# Patient Record
Sex: FEMALE | Race: WHITE | NOT HISPANIC OR LATINO | Employment: FULL TIME | URBAN - METROPOLITAN AREA
[De-identification: names, ages, dates, MRNs, and addresses within clinical notes are randomized per-mention and may not be internally consistent; named-entity substitution may affect disease eponyms.]

---

## 2017-11-22 ENCOUNTER — TELEPHONE (OUTPATIENT)
Dept: OTHER | Facility: CLINIC | Age: 37
End: 2017-11-22

## 2017-11-22 NOTE — TELEPHONE ENCOUNTER
11/22/2017    Call Regarding Onboarding [employer] Other     Attempt 1    Message on voicemail     Comments: 1 child       Outreach   CC

## 2017-12-01 NOTE — TELEPHONE ENCOUNTER
12/1/2017    Call Regarding Onboarding [employer] Other     Attempt 2    Message on voicemail     Comments: 1 child       Outreach   CC

## 2017-12-19 NOTE — TELEPHONE ENCOUNTER
12/19/2017    Call Regarding Onboarding [employer] Other     Attempt 3    Message on voicemail     Comments:       Outreach   SB

## 2018-02-03 ENCOUNTER — HEALTH MAINTENANCE LETTER (OUTPATIENT)
Age: 38
End: 2018-02-03

## 2018-05-14 ENCOUNTER — OFFICE VISIT (OUTPATIENT)
Dept: FAMILY MEDICINE | Facility: CLINIC | Age: 38
End: 2018-05-14
Payer: COMMERCIAL

## 2018-05-14 VITALS
HEIGHT: 67 IN | DIASTOLIC BLOOD PRESSURE: 77 MMHG | TEMPERATURE: 98 F | HEART RATE: 76 BPM | RESPIRATION RATE: 16 BRPM | BODY MASS INDEX: 35.16 KG/M2 | SYSTOLIC BLOOD PRESSURE: 117 MMHG | WEIGHT: 224 LBS | OXYGEN SATURATION: 98 %

## 2018-05-14 DIAGNOSIS — M54.42 ACUTE LEFT-SIDED LOW BACK PAIN WITH LEFT-SIDED SCIATICA: Primary | ICD-10-CM

## 2018-05-14 PROCEDURE — 99213 OFFICE O/P EST LOW 20 MIN: CPT | Performed by: FAMILY MEDICINE

## 2018-05-14 RX ORDER — HYDROCODONE BITARTRATE AND ACETAMINOPHEN 5; 325 MG/1; MG/1
1 TABLET ORAL EVERY 4 HOURS PRN
Qty: 20 TABLET | Refills: 0 | Status: SHIPPED | OUTPATIENT
Start: 2018-05-14 | End: 2018-05-29

## 2018-05-14 RX ORDER — PREDNISONE 20 MG/1
40 TABLET ORAL DAILY
Qty: 10 TABLET | Refills: 0 | Status: SHIPPED | OUTPATIENT
Start: 2018-05-14 | End: 2018-05-30

## 2018-05-14 ASSESSMENT — PAIN SCALES - GENERAL: PAINLEVEL: EXTREME PAIN (8)

## 2018-05-14 NOTE — MR AVS SNAPSHOT
"              After Visit Summary   5/14/2018    Marisa Randall    MRN: [id number]           Patient Information     Date Of Birth          1980        Visit Information        Provider Department      5/14/2018 8:20 AM Anita Juarez MD [facility]        Today's Diagnoses     Acute left-sided low back pain with left-sided sciatica    -  1       Follow-ups after your visit        Who to contact     If you have questions or need follow up information about today's clinic visit or your schedule please contact [facility] directly at [phone].  Normal or non-critical lab and imaging results will be communicated to you by [employer], letter or phone within 4 business days after the clinic has received the results. If you do not hear from us within 7 days, please contact the clinic through [employer] or phone. If you have a critical or abnormal lab result, we will notify you by phone as soon as possible.  Submit refill requests through [employer] or call your pharmacy and they will forward the refill request to us. Please allow 3 business days for your refill to be completed.          Additional Information About Your Visit        MyChart Information     [employer] gives you secure access to your electronic health record. If you see a primary care provider, you can also send messages to your care team and make appointments. If you have questions, please call your primary care clinic.  If you do not have a primary care provider, please call [phone] and they will assist you.        Care EveryWhere ID     This is your Care EveryWhere ID. This could be used by other organizations to access your [place] medical records  [id number]        Your Vitals Were     Pulse Temperature Respirations Height Pulse Oximetry BMI (Body Mass Index)    76 98  F (36.7  C) (Oral) 16 5' 7\" (1.702 m) 98% 35.08 kg/m2       Blood Pressure from Last 3 Encounters:   05/14/18 117/77   06/14/11 104/78   05/25/11 92/60    " Weight from Last 3 Encounters:   05/14/18 224 lb (101.6 kg)   06/14/11 219 lb (99.3 kg)   05/25/11 223 lb (101.2 kg)              Today, you had the following     No orders found for display         Today's Medication Changes          These changes are accurate as of 5/14/18 12:53 PM.  If you have any questions, ask your nurse or doctor.               Start taking these medicines.        Dose/Directions    HYDROcodone-acetaminophen 5-325 MG per tablet   Commonly known as:  NORCO   Used for:  Acute left-sided low back pain with left-sided sciatica   Started by:  Anita Juarez MD        Dose:  1 tablet   Take 1 tablet by mouth every 4 hours as needed for pain   Quantity:  20 tablet   Refills:  0       predniSONE 20 MG tablet   Commonly known as:  DELTASONE   Used for:  Acute left-sided low back pain with left-sided sciatica   Started by:  Anita Juarez MD        Dose:  40 mg   Take 2 tablets (40 mg) by mouth daily for 5 days   Quantity:  10 tablet   Refills:  0            Where to get your medicines      These medications were sent to [address] [address]     Phone:  [phone]     predniSONE 20 MG tablet         Some of these will need a paper prescription and others can be bought over the counter.  Ask your nurse if you have questions.     Bring a paper prescription for each of these medications     HYDROcodone-acetaminophen 5-325 MG per tablet               Information about OPIOIDS     PRESCRIPTION OPIOIDS: WHAT YOU NEED TO KNOW   You have a prescription for an opioid (narcotic) pain medicine. Opioids can cause addiction. If you have a history of chemical dependency of any type, you are at a higher risk of becoming addicted to opioids. Only take this medicine after all other options have been tried. Take it for as short a time and as few doses as possible.     Do not:    Drive. If you drive while taking these  medicines, you could be arrested for driving under the influence (DUI).    Operate heavy machinery    Do any other dangerous activities while taking these medicines.     Drink any alcohol while taking these medicines.      Take with any other medicines that contain acetaminophen. Read all labels carefully. Look for the word  acetaminophen  or  Tylenol.  Ask your pharmacist if you have questions or are unsure.    Store your pills in a secure place, locked if possible. We will not replace any lost or stolen medicine. If you don t finish your medicine, please throw away (dispose) as directed by your pharmacist. The [place] Pollution Control Agency has more information about safe disposal: https://www.pca.[facility].mn.us/living-green/managing-unwanted-medications    All opioids tend to cause constipation. Drink plenty of water and eat foods that have a lot of fiber, such as fruits, vegetables, prune juice, apple juice and high-fiber cereal. Take a laxative (Miralax, milk of magnesia, Colace, Senna) if you don t move your bowels at least every other day.          Primary Care Provider Office Phone # Fax #    Dagoberto Sykes [phone]       [facility] [address] [facility] [address]        Equal Access to Services     GLENN MAIN : Hadii gauri busch hadasho Soivanali, waaxda luqadaha, qaybta kaalmada adecrisyada, ema shah. So [facility] [phone].    ATENCIÓN: Si habla español, tiene a salinas disposición servicios gratuitos de asistencia lingüística. Rocío al [phone].    We comply with applicable federal civil rights laws and [place] laws. We do not discriminate on the basis of race, color, national origin, age, disability, sex, sexual orientation, or gender identity.            Thank you!     Thank you for choosing [facility]  for your care. Our goal is always to provide you with excellent care. Hearing back from our patients is one way we can continue to  improve our services. Please take a few minutes to complete the written survey that you may receive in the mail after your visit with us. Thank you!             Your Updated Medication List - Protect others around you: Learn how to safely use, store and throw away your medicines at www.disposemymeds.org.          This list is accurate as of 5/14/18 12:53 PM.  Always use your most recent med list.                   Brand Name Dispense Instructions for use Diagnosis    HYDROcodone-acetaminophen 5-325 MG per tablet    NORCO    20 tablet    Take 1 tablet by mouth every 4 hours as needed for pain    Acute left-sided low back pain with left-sided sciatica       MIRENA IU      by Intrauterine route.        predniSONE 20 MG tablet    DELTASONE    10 tablet    Take 2 tablets (40 mg) by mouth daily for 5 days    Acute left-sided low back pain with left-sided sciatica

## 2018-05-14 NOTE — PROGRESS NOTES
"  SUBJECTIVE:   Marisa Randall is a 38 year old female who presents to clinic today for the following health issues:        Back Pain       Duration: 1 week         Specific cause: none    Description:   Location of pain: low back both  Character of pain: sharp, dull ache and stabbing  Pain radiation:down to both knees  New numbness or weakness in legs, not attributed to pain:  no     Intensity: Currently 8/10    History:   Pain interferes with job: YES,   History of back problems: previous herniated disc  Any previous MRI or X-rays: Yes [facility] 2015  Sees a specialist for back pain:  No  Therapies tried without relief: heat and cold and otc meds    Alleviating factors:   Improved by: none      Precipitating factors:  Worsened by: Lifting, Bending, Standing, Sitting, Lying Flat, Walking and Coughing    Functional and Psychosocial Screen (Al STarT Back):      Not performed today          Accompanying Signs & Symptoms:  Risk of Fracture:  None  Risk of Cauda Equina:  None  Risk of Infection:  None  Risk of Cancer:  None  Risk of Ankylosing Spondylitis:  Onset at age <35, male, AND morning back stiffness. no       Pt with herniated disc at L5-S1 in 2015.  Had injections and gave her gabapentin and then resolved on its own  This was done in [facility]  Does have some radiculopathy down to knees  Right> left                Problem list and histories reviewed & adjusted, as indicated.  Additional history: as documented    Labs reviewed in EPIC    Reviewed and updated as needed this visit by clinical staff  Tobacco  Allergies  Meds  Med Hx  Surg Hx  Fam Hx  Soc Hx      Reviewed and updated as needed this visit by Provider         ROS:  Constitutional, HEENT, cardiovascular, pulmonary, gi and gu systems are negative, except as otherwise noted.    OBJECTIVE:     /77  Pulse 76  Temp 98  F (36.7  C) (Oral)  Resp 16  Ht 5' 7\" (1.702 m)  Wt 224 lb (101.6 kg)  SpO2 98%  BMI 35.08 kg/m2  Body mass " index is 35.08 kg/(m^2).  GENERAL: healthy, alert and no distress  BACK: no lumbar spinal tenderness. Does have some left SI joint pain to palpation , + SLR test bilaterally, left> right    Diagnostic Test Results:  none     ASSESSMENT/PLAN:     1. Acute left-sided low back pain with left-sided sciatica  Consider PT. FU if any red flag signs or if not improving  - predniSONE (DELTASONE) 20 MG tablet; Take 2 tablets (40 mg) by mouth daily for 5 days  Dispense: 10 tablet; Refill: 0  - HYDROcodone-acetaminophen (NORCO) 5-325 MG per tablet; Take 1 tablet by mouth every 4 hours as needed for pain  Dispense: 20 tablet; Refill: 0        Anita Vazquez MD  [facility]

## 2018-05-29 ENCOUNTER — MYC REFILL (OUTPATIENT)
Dept: FAMILY MEDICINE | Facility: CLINIC | Age: 38
End: 2018-05-29

## 2018-05-29 DIAGNOSIS — M54.42 ACUTE LEFT-SIDED LOW BACK PAIN WITH LEFT-SIDED SCIATICA: ICD-10-CM

## 2018-05-29 NOTE — TELEPHONE ENCOUNTER
Controlled Substance Refill Request for norco  Problem List Complete:  No   PROVIDER TO CONSIDER COMPLETION OF PROBLEM LIST AND OVERVIEW/CONTROLLED SUBSTANCE AGREEMENT  Last Written Prescription Date:  5/14/18  Last Fill Quantity: 20,   # refills: 0  [place] [facility] [place]14/18  Future Office visit: none  Controlled substance agreement on file: No.   checked in past 6 months? Unable to pull  up. Getting blank page on Internet.  Please see other message for steroid request.   Thank you, Neha Mohan, HALLIEN RN

## 2018-05-29 NOTE — TELEPHONE ENCOUNTER
Message from [employer]:  Original authorizing provider: MD Marisa Davis would like a refill of the following medications:  HYDROcodone-acetaminophen (NORCO) 5-325 MG per tablet [Anita Vazquez MD]    Preferred pharmacy: [facility] [address], [address]    Comment:  I am still having pain. I am also wondering if I can get a refill of the oral steroid that I was given.

## 2018-05-30 RX ORDER — HYDROCODONE BITARTRATE AND ACETAMINOPHEN 5; 325 MG/1; MG/1
1 TABLET ORAL EVERY 4 HOURS PRN
Qty: 20 TABLET | Refills: 0 | Status: SHIPPED | OUTPATIENT
Start: 2018-05-30 | End: 2022-11-29

## 2018-05-30 RX ORDER — PREDNISONE 20 MG/1
40 TABLET ORAL DAILY
Qty: 10 TABLET | Refills: 0 | Status: SHIPPED | OUTPATIENT
Start: 2018-05-30 | End: 2018-06-06

## 2018-05-30 NOTE — TELEPHONE ENCOUNTER
I walked 1 Rx to the clinic pharmacy and dropped it off to be filled.  [employer]ado message sent.  Shweta Bowser,

## 2018-05-30 NOTE — TELEPHONE ENCOUNTER
Will refill both one more time  Will need to be seen for further refills.   Please fax rx and let pt know    Anita Vazquez MD

## 2018-05-30 NOTE — TELEPHONE ENCOUNTER
Did the steroid help at all the first time?  If not, does not pay to do a second round.    Anita Vazquez MD

## 2018-06-06 ENCOUNTER — OFFICE VISIT (OUTPATIENT)
Dept: INTERNAL MEDICINE | Facility: CLINIC | Age: 38
End: 2018-06-06
Payer: COMMERCIAL

## 2018-06-06 VITALS
WEIGHT: 224 LBS | BODY MASS INDEX: 35.16 KG/M2 | OXYGEN SATURATION: 98 % | HEIGHT: 67 IN | SYSTOLIC BLOOD PRESSURE: 123 MMHG | RESPIRATION RATE: 16 BRPM | DIASTOLIC BLOOD PRESSURE: 81 MMHG | TEMPERATURE: 98.2 F | HEART RATE: 93 BPM

## 2018-06-06 DIAGNOSIS — S39.012D BACK STRAIN, SUBSEQUENT ENCOUNTER: Primary | ICD-10-CM

## 2018-06-06 PROCEDURE — 99214 OFFICE O/P EST MOD 30 MIN: CPT | Performed by: INTERNAL MEDICINE

## 2018-06-06 RX ORDER — METHOCARBAMOL 500 MG/1
1000 TABLET, FILM COATED ORAL 3 TIMES DAILY PRN
Qty: 30 TABLET | Refills: 1 | Status: SHIPPED | OUTPATIENT
Start: 2018-06-06

## 2018-06-06 RX ORDER — KETOROLAC TROMETHAMINE 10 MG/1
10 TABLET, FILM COATED ORAL EVERY 6 HOURS PRN
Qty: 20 TABLET | Refills: 0 | Status: SHIPPED | OUTPATIENT
Start: 2018-06-06 | End: 2018-06-13

## 2018-06-06 NOTE — PATIENT INSTRUCTIONS
Please [phone] call to schedule the MRI and complete it within the next 1-2 weeks.      Start taking the nonsteroidal anti-inflammatory drug (NSAID), toradol, as needed.  Please take the Toradol per package instructions. Do not take Aleve (naproxen), aspirin, ibuprofen (Motrin, Advil) or Excedrin on the same day that you take the Toradol.  Take it with food or milk and please drink enough fluids, 6-8 cups of non-caffeinated beverages a day.  Take the muscle relaxant Robaxin as per prescription directions.  Take the above medications and rest for 2 days and on the third day, start doing range of motion exercises with the affected area.  Use cooling or heating packs-whatever makes your pain better.  Return to clinic if not better after 2 weeks of the above treatment.      If you develop new or worse numbness or weakness of your legs or an inability to hold in urine or feces, please call the clinic promptly to discuss this. If you develop an inability to get your urine out, please call please call the clinic promptly to discuss this. If you are calling after regular business hours and cannot reach a live person in our clinic, I would advise you to go to the Emergency Room for these symptoms.

## 2018-06-06 NOTE — PROGRESS NOTES
SUBJECTIVE:   Marisa Randall is a 38 year old female who presents to clinic today for the following health issues:      Back Pain       Duration: 1.5 months started in back and now going going down hip to ankle        Specific cause: unsure, has a bulging disk diagnosed 5 years ago    Description:   Location of pain: low back right, hip right and right leg  Character of pain: sharp  Pain radiation:radiates into the right leg and radiates into the right foot  New numbness or weakness in legs, not attributed to pain:  no     Intensity: Currently 8/10    History:   Pain interferes with job: YES,   History of back problems: has a buldging disk  Any previous MRI or X-rays: Yes--at [facility] .  Date 5 years ago  Sees a specialist for back pain:  No  Therapies tried without relief: cold, heat, opioids, home exercises and rest; patient also had a TENS unit on 5.19.18 but it has not helped.      Alleviating factors:   Improved by: nothing      Precipitating factors:  Worsened by: Sitting and Walking    Functional and Psychosocial Screen (Al STarT Back):      Not performed today    Patient was seen on 5.14.18, rxed a 5 day prednisone burst and prn Norco-the location of the pain has changed, ie, from the low back, now to the right lower extremity-the pain now extends from the hip to the ankle, hurts to lie down on that side. No numbness or weakness of the right lower extremity.   Of note, the patient had a herniated lumbar disc (L5-S1) 3-8 yrs ago, for which she got a variety fo treatments, including P.T., injections, gabapentin, followed with Pain Medicine and it eventually resolved in a few months.  She feels that it was the 2nd cortisone shot that had really helped her back pain, at that time a few yrs ago.     No lbp red flags.  No IVDU.         Reviewed and updated as needed this visit by clinical staff  Tobacco  Allergies  Meds  Problems  Med Hx  Surg Hx  Fam Hx  Soc Hx        Reviewed and updated as  "needed this visit by Provider  Meds  Problems           Patient Active Problem List   Diagnosis     Carpal tunnel syndrome     Radial styloid tenosynovitis     Synovitis and tenosynovitis     Wrist tendonitis       History reviewed. No pertinent past medical history.    History reviewed. No pertinent surgical history.    Family History   Problem Relation Age of Onset     DIABETES Mother      Colon Cancer Father        Social History   Substance Use Topics     Smoking status: Former Smoker     Smokeless tobacco: Never Used     Alcohol use Yes      Comment: rare       Current Outpatient Prescriptions   Medication     HYDROcodone-acetaminophen (NORCO) 5-325 MG per tablet     ketorolac (TORADOL) 10 MG tablet     Levonorgestrel (MIRENA IU)     methocarbamol (ROBAXIN) 500 MG tablet     No current facility-administered medications for this visit.          ROS:  Constitutional, HEENT, cardiovascular, pulmonary, GI, , musculoskeletal, neuro, skin, endocrine and psych systems are negative, except as otherwise noted.     OBJECTIVE:                                                    /81  Pulse 93  Temp 98.2  F (36.8  C) (Oral)  Resp 16  Ht 5' 7\" (1.702 m)  Wt 224 lb (101.6 kg)  SpO2 98%  BMI 35.08 kg/m2     GENERAL APPEARANCE: healthy, alert and in no distress    MS:   There was *no* pain of the right lower extremity with internal rotation of the right hip. There was pain on external rotation of the right lower extremity. SLR of the right lower extremity was positive. O/w, LBP exam within normal limits.  ow, no musculoskeletal defects are noted and gait is age appropriate without ataxia  SKIN: no suspicious lesions or rashes  NEURO: mentation intact and speech normal  PSYCH: mentation appears normal and affect normal/bright.    Results for orders placed or performed in visit on 09/02/15   ABSTRACT HPV-NO CHARGE   Result Value Ref Range    HPV Abstract See Scanned Document     Narrative    LAB RESULT " [facility]   ABSTRACT PAP-NO CHARGE   Result Value Ref Range    PAP-ABSTRACT See Scanned Document     Narrative    LAB RESULT [facility]       Recent Results (from the past 744 hour(s))   MR Lumbar Spine w/o Contrast    Narrative    MRI LUMBAR SPINE WITHOUT CONTRAST June 11, 2018 5:20 PM     HISTORY: Back pain for three weeks; positive straight leg raise on  exam, thus far not improving with conservative treatment. Back strain,  subsequent encounter.    TECHNIQUE: Multiplanar multisequence MRI of the lumbar spine without  contrast.    COMPARISON: None.    FINDINGS: The report is dictated assuming five lumbar-type vertebral  bodies. Sagittal images demonstrate normal vertebral body height. Bone  marrow signal is unremarkable. Tip of the conus medullaris and cauda  equina are unremarkable.     T12-L1: No disc herniation or stenosis. Facet joints are unremarkable.    L1-L2: No disc herniation or stenosis. Facet joints are unremarkable.       L2-L3: No disc herniation or stenosis. Facet joints are unremarkable.    L3-L4: No disc herniation or stenosis. Facet joints are unremarkable.     L4-L5: There is a large right central disc herniation that protrudes  posteriorly approximately 0.9 cm and measures approximately 1.2 cm at  its base. Severe central and right subarticular stenosis. Neural  foramen are patent. Mild left facet degenerative changes.    L5-S1: Degenerative disc disease with loss of disc space height. Mild  disc bulge. No central stenosis. Mild bilateral foraminal stenosis.    Paraspinous soft tissues: Unremarkable.      Impression    IMPRESSION:    1. At L4-L5 there is a large right central disc herniation that  results in severe central and right subarticular stenosis. Neural  foramen are patent.  2. Degenerative disc disease at L5-S1 without central stenosis. Mild  bilateral foraminal stenosis.    DIANDRA LARSON MD         ASSESSMENT/PLAN:                                                         ICD-10-CM    1. Back strain, subsequent encounter S39.012D ketorolac (TORADOL) 10 MG tablet     methocarbamol (ROBAXIN) 500 MG tablet     MR Lumbar Spine w/o Contrast       Patient Instructions   Please [phone] call to schedule the MRI and complete it within the next 1-2 weeks.      Start taking the nonsteroidal anti-inflammatory drug (NSAID), toradol, as needed.  Please take the Toradol per package instructions. Do not take Aleve (naproxen), aspirin, ibuprofen (Motrin, Advil) or Excedrin on the same day that you take the Toradol.  Take it with food or milk and please drink enough fluids, 6-8 cups of non-caffeinated beverages a day.  Take the muscle relaxant Robaxin as per prescription directions.  Take the above medications and rest for 2 days and on the third day, start doing range of motion exercises with the affected area.  Use cooling or heating packs-whatever makes your pain better.  Return to clinic if not better after 2 weeks of the above treatment.      If you develop new or worse numbness or weakness of your legs or an inability to hold in urine or feces, please call the clinic promptly to discuss this. If you develop an inability to get your urine out, please call please call the clinic promptly to discuss this. If you are calling after regular business hours and cannot reach a live person in our clinic, I would advise you to go to the Emergency Room for these symptoms.         Liza Patel MD    [facility]  [address] [address] [phone]

## 2018-06-06 NOTE — MR AVS SNAPSHOT
After Visit Summary   6/6/2018    Marisa Randall    MRN: [id number]           Patient Information     Date Of Birth          1980        Visit Information        Provider Department      6/6/2018 6:00 PM Liza Patel MD [facility]        Today's Diagnoses     Back strain, subsequent encounter    -  1      Care Instructions    Please [phone] call to schedule the MRI and complete it within the next 1-2 weeks.      Start taking the nonsteroidal anti-inflammatory drug (NSAID), toradol, as needed.  Please take the Toradol per package instructions. Do not take Aleve (naproxen), aspirin, ibuprofen (Motrin, Advil) or Excedrin on the same day that you take the Toradol.  Take it with food or milk and please drink enough fluids, 6-8 cups of non-caffeinated beverages a day.  Take the muscle relaxant Robaxin as per prescription directions.  Take the above medications and rest for 2 days and on the third day, start doing range of motion exercises with the affected area.  Use cooling or heating packs-whatever makes your pain better.  Return to clinic if not better after 2 weeks of the above treatment.      If you develop new or worse numbness or weakness of your legs or an inability to hold in urine or feces, please call the clinic promptly to discuss this. If you develop an inability to get your urine out, please call please call the clinic promptly to discuss this. If you are calling after regular business hours and cannot reach a live person in our clinic, I would advise you to go to the Emergency Room for these symptoms.             Follow-ups after your visit        Future tests that were ordered for you today     Open Future Orders        Priority Expected Expires Ordered    MR Lumbar Spine w/o Contrast Routine  6/6/2019 6/6/2018            Who to contact     If you have questions or need follow up information about today's clinic visit or your schedule please contact  "[facility] directly at [phone].  Normal or non-critical lab and imaging results will be communicated to you by MyChart, letter or phone within 4 business days after the clinic has received the results. If you do not hear from us within 7 days, please contact the clinic through IIDhart or phone. If you have a critical or abnormal lab result, we will notify you by phone as soon as possible.  Submit refill requests through Getyoo or call your pharmacy and they will forward the refill request to us. Please allow 3 business days for your refill to be completed.          Additional Information About Your Visit        IIDharEyevensys Information     Getyoo gives you secure access to your electronic health record. If you see a primary care provider, you can also send messages to your care team and make appointments. If you have questions, please call your primary care clinic.  If you do not have a primary care provider, please call [phone] and they will assist you.        Care EveryWhere ID     This is your Care EveryWhere ID. This could be used by other organizations to access your [facility] medical records  [id number]        Your Vitals Were     Pulse Temperature Respirations Height Pulse Oximetry BMI (Body Mass Index)    93 98.2  F (36.8  C) (Oral) 16 5' 7\" (1.702 m) 98% 35.08 kg/m2       Blood Pressure from Last 3 Encounters:   06/06/18 123/81   05/14/18 117/77   06/14/11 104/78    Weight from Last 3 Encounters:   06/06/18 224 lb (101.6 kg)   05/14/18 224 lb (101.6 kg)   06/14/11 219 lb (99.3 kg)                 Today's Medication Changes          These changes are accurate as of 6/6/18  6:35 PM.  If you have any questions, ask your nurse or doctor.               Start taking these medicines.        Dose/Directions    ketorolac 10 MG tablet   Commonly known as:  TORADOL   Used for:  Back strain, subsequent encounter   Started by:  Liza Patel MD        Dose:  10 mg   Take 1 tablet (10 " mg) by mouth every 6 hours as needed   Quantity:  20 tablet   Refills:  0       methocarbamol 500 MG tablet   Commonly known as:  ROBAXIN   Used for:  Back strain, subsequent encounter   Started by:  Liza Patel MD        Dose:  1000 mg   Take 2 tablets (1,000 mg) by mouth 3 times daily as needed for muscle spasms   Quantity:  30 tablet   Refills:  1            Where to get your medicines      These medications were sent to [facility] [address] [address], [address]  [address] [address] [address]     Phone:  [phone]     ketorolac 10 MG tablet    methocarbamol 500 MG tablet                Primary Care Provider Office Phone # Fax #    Dagoberto Sykes [phone]       [facility] [address] [place] [address]        Equal Access to Services     [facility]: Hadii gauri busch hadasho Soomaali, waaxda luqadaha, qaybta kaalmada adeegyada, ema parra haymarge tony . So [facility] [phone].    ATENCIÓN: Si habla español, tiene a salinas disposición servicios gratuitos de asistencia lingüística. Llame al [phone].    We comply with applicable federal civil rights laws and [place] laws. We do not discriminate on the basis of race, color, national origin, age, disability, sex, sexual orientation, or gender identity.            Thank you!     Thank you for choosing [facility]  for your care. Our goal is always to provide you with excellent care. Hearing back from our patients is one way we can continue to improve our services. Please take a few minutes to complete the written survey that you may receive in the mail after your visit with us. Thank you!             Your Updated Medication List - Protect others around you: Learn how to safely use, store and throw away your medicines at www.disposemymeds.org.          This list is accurate as of 6/6/18  6:35 PM.  Always use your most recent med list.                    Brand Name Dispense Instructions for use Diagnosis    HYDROcodone-acetaminophen 5-325 MG per tablet    NORCO    20 tablet    Take 1 tablet by mouth every 4 hours as needed for pain    Acute left-sided low back pain with left-sided sciatica       ketorolac 10 MG tablet    TORADOL    20 tablet    Take 1 tablet (10 mg) by mouth every 6 hours as needed    Back strain, subsequent encounter       methocarbamol 500 MG tablet    ROBAXIN    30 tablet    Take 2 tablets (1,000 mg) by mouth 3 times daily as needed for muscle spasms    Back strain, subsequent encounter       MIRENA IU      by Intrauterine route.

## 2018-06-11 ENCOUNTER — RADIANT APPOINTMENT (OUTPATIENT)
Dept: MRI IMAGING | Facility: CLINIC | Age: 38
End: 2018-06-11
Attending: INTERNAL MEDICINE
Payer: COMMERCIAL

## 2018-06-11 ENCOUNTER — MYC MEDICAL ADVICE (OUTPATIENT)
Dept: INTERNAL MEDICINE | Facility: CLINIC | Age: 38
End: 2018-06-11

## 2018-06-11 DIAGNOSIS — M51.26 HERNIATION OF INTERVERTEBRAL DISC BETWEEN L4 AND L5: Primary | ICD-10-CM

## 2018-06-11 DIAGNOSIS — S39.012D BACK STRAIN, SUBSEQUENT ENCOUNTER: ICD-10-CM

## 2018-06-11 PROCEDURE — 72148 MRI LUMBAR SPINE W/O DYE: CPT | Mod: TC

## 2018-06-12 PROBLEM — M51.26 HERNIATION OF INTERVERTEBRAL DISC BETWEEN L4 AND L5: Status: ACTIVE | Noted: 2018-06-12

## 2018-06-12 NOTE — PROGRESS NOTES
PLEASE CALL PATIENT TO GET REFERRAL SCHEDULED ASAP FOR NONSURGICAL SPINE: Dear Marisa,   I am covering for [employer] while she is away. 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  You also have stenosis (narrowing of spine canal).  I would like you to meet with nonsurgical spine specialist asap to discuss best options from here.         If you have any questions or concerns, please call the clinic at [phone].    Sincerely,  Zeina Yañez PA-C

## 2018-06-13 ENCOUNTER — OFFICE VISIT (OUTPATIENT)
Dept: ORTHOPEDICS | Facility: CLINIC | Age: 38
End: 2018-06-13
Payer: COMMERCIAL

## 2018-06-13 VITALS
DIASTOLIC BLOOD PRESSURE: 84 MMHG | BODY MASS INDEX: 35.31 KG/M2 | SYSTOLIC BLOOD PRESSURE: 128 MMHG | HEIGHT: 67 IN | WEIGHT: 225 LBS

## 2018-06-13 DIAGNOSIS — M54.16 LUMBAR RADICULOPATHY, RIGHT: ICD-10-CM

## 2018-06-13 DIAGNOSIS — M51.26 HERNIATION OF INTERVERTEBRAL DISC BETWEEN L4 AND L5: Primary | ICD-10-CM

## 2018-06-13 PROCEDURE — 99244 OFF/OP CNSLTJ NEW/EST MOD 40: CPT | Performed by: PEDIATRICS

## 2018-06-13 RX ORDER — GABAPENTIN 300 MG/1
CAPSULE ORAL
Qty: 90 CAPSULE | Refills: 0 | Status: SHIPPED | OUTPATIENT
Start: 2018-06-13 | End: 2022-11-29

## 2018-06-13 RX ORDER — KETOROLAC TROMETHAMINE 10 MG/1
10 TABLET, FILM COATED ORAL EVERY 6 HOURS PRN
Qty: 20 TABLET | Refills: 0 | Status: SHIPPED | OUTPATIENT
Start: 2018-06-13 | End: 2022-11-29

## 2018-06-13 NOTE — MR AVS SNAPSHOT
After Visit Summary   6/13/2018    Marisa Randall    MRN: [id number]           Patient Information     Date Of Birth          1980        Visit Information        Provider Department      6/13/2018 1:40 PM Danny Tatum,  [facility]        Today's Diagnoses     Lumbar radiculopathy, right          Care Instructions    Apply ice or cold packs, heat or warm packs intermittently as needed to relieve pain.     Referred for corticosteroid injection     Prescribed Toradol     Prescribed Gabapentin, call with an update in 7-10 days     Referred to physical therapy     Follow up 2-3 weeks after corticosteroid injection for recheck           Follow-ups after your visit        Additional Services     AB PT, HAND, AND CHIROPRACTIC REFERRAL       **This order will print in the [facility] Scheduling Office**    Physical Therapy, Hand Therapy and Chiropractic Care are available through:    *[facility]  *[facility]  *[facility]    Call one number to schedule at any of the above locations: [phone].    Your provider has referred you to: Physical Therapy at [facility] or [facility]    Indication/Reason for Referral: Low Back Pain  Onset of Illness:   Therapy Orders: Evaluate and Treat  Special Programs: None  Special Request: None    Al Melendez      Additional Comments for the Therapist or Chiropractor:       Please be aware that coverage of these services is subject to the terms and limitations of your health insurance plan.  Call member services at your health plan with any benefit or coverage questions.      Please bring the following to your appointment:    *Your personal calendar for scheduling future appointments  *Comfortable clothing            PAIN MANAGEMENT REFERRAL       Your provider [place] you [place] [facility] -    Reason for Referral: Procedure Order Epidural:  Lumbar (Advanced  imaging required in the last 3 years)      What is your diagnosis for the patient's pain? Lumbar radiculopathy       For any questions, contact the [facility] at [phone].     **ANY DIAGNOSTIC TESTS THAT ARE NOT IN EPIC SHOULD BE SENT TO THE PAIN CENTER**    REGARDING OPIOID MEDICATIONS:  The discussion of opioids management, appropriateness of therapy, and dosing will be discussed in patients being seen for evaluation.  The pain management clinics are not long-term prescribing clinics, with transition of prescribing of medications ultimately going back to the referring provider/PCP.  If prescribing is taken over at the pain clinic, it is in actively involved patients whom are appropriate for opioids, urine drug screening is completed, and long-term prescribing plan has been determined.  Therefore, we will not be automatically taking over prescribing at the patient's first visit.  Is this agreeable to you? agrees.     Please be aware that coverage of these services is subject to the terms and limitations of your health insurance plan.  Call member services at your health plan with any benefit or coverage questions.      Please bring the following with you to your appointment:    (1) Any X-Rays, CTs or MRIs which have been performed.  Contact the facility where they were done to arrange for  prior to your scheduled appointment.    (2) List of current medications   (3) This referral request   (4) Any documents/labs given to you for this referral                  Who to contact     If you have questions or need follow up information about today's clinic visit or your schedule please contact [facility] directly at [phone].  Normal or non-critical lab and imaging results will be communicated to you by MyChart, letter or phone within 4 business days after the clinic has received the results. If you do not hear from us within 7 days, please contact the  "clinic through PreApps or phone. If you have a critical or abnormal lab result, we will notify you by phone as soon as possible.  Submit refill requests through PreApps or call your pharmacy and they will forward the refill request to us. Please allow 3 business days for your refill to be completed.          Additional Information About Your Visit        VictorharVisual Unity Information     PreApps gives you secure access to your electronic health record. If you see a primary care provider, you can also send messages to your care team and make appointments. If you have questions, please call your primary care clinic.  If you do not have a primary care provider, please call [phone] and they will assist you.        Care EveryWhere ID     This is your Care EveryWhere ID. This could be used by other organizations to access your [facility] medical records  [id number]        Your Vitals Were     Height BMI (Body Mass Index)                5' 7\" (1.702 m) 35.24 kg/m2           Blood Pressure from Last 3 Encounters:   06/13/18 128/84   06/06/18 123/81   05/14/18 117/77    Weight from Last 3 Encounters:   06/13/18 225 lb (102.1 kg)   06/06/18 224 lb (101.6 kg)   05/14/18 224 lb (101.6 kg)              We Performed the Following     AB PT, HAND, AND CHIROPRACTIC REFERRAL     PAIN MANAGEMENT REFERRAL          Today's Medication Changes          These changes are accurate as of 6/13/18  2:46 PM.  If you have any questions, ask your nurse or doctor.               Start taking these medicines.        Dose/Directions    gabapentin 300 MG capsule   Commonly known as:  NEURONTIN   Used for:  Lumbar radiculopathy, right   Started by:  Danny Tatum, DO        Take 1 tablet (300 mg) every night for 1-3 days, then 1 tablet twice daily for 1-3 days, then 1 tablet three times daily   Quantity:  90 capsule   Refills:  0            Where to get your medicines      These medications were sent to [facility] - " [address] [facility]  [address] [facility] [place] [address]     Phone:  [phone]     gabapentin 300 MG capsule    ketorolac 10 MG tablet                Primary Care Provider Office Phone # Fax #    Dagoberto Sykes [phone]       [facility] [address]  [place] [address]        Equal Access to Services     [facility]: Hadii aad ku hadasho Soomaali, waaxda luqadaha, qaybta kaalmada adeegyada, waxay idiin hayaan adeeg kharash la'aan . So [facility] [phone].    ATENCIÓN: Si habla español, tiene a salinas disposición servicios gratuitos de asistencia lingüística. Katame al [phone].    We comply with applicable federal civil rights laws and [place] laws. We do not discriminate on the basis of race, color, national origin, age, disability, sex, sexual orientation, or gender identity.            Thank you!     Thank you for choosing [facility]  for your [facility]. Our goal is always to provide you with excellent care. Hearing back from our patients is one way we can continue to improve our services. Please take a few minutes to complete the written survey that you may receive in the mail after your visit with us. Thank you!             Your Updated Medication List - Protect others around you: Learn how to safely use, store and throw away your medicines at www.disposemymeds.org.          This list is accurate as of 6/13/18  2:46 PM.  Always use your most recent med list.                   Brand Name Dispense Instructions for use Diagnosis    gabapentin 300 MG capsule    NEURONTIN    90 capsule    Take 1 tablet (300 mg) every night for 1-3 days, then 1 tablet twice daily for 1-3 days, then 1 tablet three times daily    Lumbar radiculopathy, right       HYDROcodone-acetaminophen 5-325 MG per tablet    NORCO    20 tablet    Take 1 tablet by mouth every 4 hours as needed for pain    Acute left-sided low back pain with left-sided sciatica       ketorolac 10  MG tablet    TORADOL    20 tablet    Take 1 tablet (10 mg) by mouth every 6 hours as needed    Lumbar radiculopathy, right       methocarbamol 500 MG tablet    ROBAXIN    30 tablet    Take 2 tablets (1,000 mg) by mouth 3 times daily as needed for muscle spasms    Back strain, subsequent encounter       MIRENA IU      by Intrauterine route.

## 2018-06-13 NOTE — LETTER
6/13/2018         RE: Marisa Randall  [id number] [facility] [id number]        Dear Colleague,    Thank you for referring your patient, Marisa Randall, to the [facility] [place]. Please see a copy of my visit note below.    Sports Medicine Clinic Visit    PCP: Dagoberto Sykes (Inactive)    Marisa Randall is a 38 year old female who is seen  in consultation at the request of  Zeina Yañez PA-C presenting with low back pain.  Pain has been intermittent for many years.  Did have an incrase in pain recently and underwent an MRI.  Pain on the right side low and down her leg with numbness and tingling to her right foot.  She feels that all of her toes but her right small toe are numb.    Has and JOSE in the past, she believes it was about 6 years ago.  Has done PT, but difficulty with continuing her HEP currently.  Has been using a TENS unit.    Denies any issues with bowel or bladder control     **  Did not have any changes to activities prior to recent episode of right sided low back pain. She has had 3 different corticosteroid injections in the low back for previous episode. Pain at rest radiating from the low back, moving down the lateral aspect of the right lower extremity, down into the toes.     She tolerated gabapentin with last episode.    She has not tried traction before.     6/2011, L5-S1 interlaminar corticosteroid injection   9/2011 L5-S1 interlaminar corticosteroid injection   11/2013 L5-S1 interlaminar corticosteroid injection - which gave good relief.     Injury: ongoing     Location of Pain: right side low back and leg pain   Duration of Pain: 5.5 week(s) of increasing pain   Rating of Pain at worst: 8/10  Rating of Pain Currently: 6/10  Symptoms are better with: Nothing  Symptoms are worse with: laying down, movement, sneezing, coughing   Additional Features:   Positive: paresthesias and weakness   Negative: swelling, bruising, popping, grinding, catching,  "locking, instability, pain in other joints and systemic symptoms  Other evaluation and/or treatments so far consists of: MRI; in the past, she had injections noted above   prior History of related problems: ongoing     Social History: desk job     Marisa was asked to complete the Oswestry Low Back Disability Index .  today in the office.  Disability score: 57.78%.      Review of Systems  Musculoskeletal: as above  Remainder of review of systems is negative including constitutional, CV, pulmonary, GI, Skin and Neurologic except as noted in HPI or medical history.    This document serves as a record of the services and decisions personally performed and made by Danny Tatum DO, CAQ. It was created on his behalf by Nikunj Mulligan, a trained medical scribe. The creation of this document is based the provider's statements to the medical scribe.  Nikunj Mulligan June 13, 2018, 2:15 PM      Patient Active Problem List   Diagnosis     Carpal tunnel syndrome     Radial styloid tenosynovitis     Synovitis and tenosynovitis     Wrist tendonitis     Herniation of intervertebral disc between L4 and L5     PMHx: Above  PSHx: see chart    Family History   Problem Relation Age of Onset     DIABETES Mother      Colon Cancer Father      Social History     Social History     Marital status: Single     Spouse name: N/A     Number of children: N/A     Years of education: N/A     Occupational History     Not on file.     Social History Main Topics     Smoking status: Former Smoker     Smokeless tobacco: Never Used     Alcohol use Yes      Comment: rare     Drug use: No     Sexual activity: Not Currently     Birth control/ protection: IUD     Other Topics Concern     Not on file     Social History Narrative       Objective  /84  Ht 5' 7\" (1.702 m)  Wt 225 lb (102.1 kg)  BMI 35.24 kg/m2    GENERAL APPEARANCE: healthy, alert and no distress   GAIT: antalgic  SKIN: no suspicious lesions or rashes  NEURO: Normal strength and " tone, mentation intact and speech normal  PSYCH:  mentation appears normal and affect normal/bright  HEENT: no scleral icterus  CV: no extremity edema   RESP: nonlabored breathing    Low back exam:    Inspection:       no visible deformity in the low back       normal skin       normal vascular       normal lymphatic    Strength:       hip flexion 5/5       knee extension 4+/5 on the right, 5/5 on the left        ankle dorsiflexion 4/5 on the right, 5/5 on the left        ankle plantarflexion 5/5 bilaterally        dorsiflexion of the great toe 5/5       knee flexion 5/5 bilaterally     Reflexes:       patellar (L3, L4) symmetric normal       achilles tendons (S1) symmetric normal    Sensation:      grossly intact throughout lower extremities    Skin:       well perfused       capillary refill brisk    Special tests:       Difficulty walking on toes, with pain in low back       Difficult to walk on heels on right    **      Radiology:  Visualized MRI of the lumbar spine from 6/11/2018, and reviewed images and report with patient.  MRI demonstrates L4-L5 and L5-S1 degenerative disc disease, with right-sided disc herniation L4-L5.    Results for orders placed or performed in visit on 06/11/18   MR Lumbar Spine w/o Contrast    Narrative    MRI LUMBAR SPINE WITHOUT CONTRAST June 11, 2018 5:20 PM     HISTORY: Back pain for three weeks; positive straight leg raise on  exam, thus far not improving with conservative treatment. Back strain,  subsequent encounter.    TECHNIQUE: Multiplanar multisequence MRI of the lumbar spine without  contrast.    COMPARISON: None.    FINDINGS: The report is dictated assuming five lumbar-type vertebral  bodies. Sagittal images demonstrate normal vertebral body height. Bone  marrow signal is unremarkable. Tip of the conus medullaris and cauda  equina are unremarkable.     T12-L1: No disc herniation or stenosis. Facet joints are unremarkable.    L1-L2: No disc herniation or stenosis. Facet  joints are unremarkable.       L2-L3: No disc herniation or stenosis. Facet joints are unremarkable.    L3-L4: No disc herniation or stenosis. Facet joints are unremarkable.     L4-L5: There is a large right central disc herniation that protrudes  posteriorly approximately 0.9 cm and measures approximately 1.2 cm at  its base. Severe central and right subarticular stenosis. Neural  foramen are patent. Mild left facet degenerative changes.    L5-S1: Degenerative disc disease with loss of disc space height. Mild  disc bulge. No central stenosis. Mild bilateral foraminal stenosis.    Paraspinous soft tissues: Unremarkable.      Impression    IMPRESSION:    1. At L4-L5 there is a large right central disc herniation that  results in severe central and right subarticular stenosis. Neural  foramen are patent.  2. Degenerative disc disease at L5-S1 without central stenosis. Mild  bilateral foraminal stenosis.    DIANDRA LARSON MD     Assessment:  1. Herniation of intervertebral disc between L4 and L5    2. Lumbar radiculopathy, right        Plan:  Discussed the assessment with the patient, her mother, and her daughter.    We discussed the following: symptom treatment, activity modification/rest, imaging, rehab, injection therapy, medication, traction/inversion table, and referral to spine surgeon. Following discussion, plan:   Topical Treatments: The patient is advised to apply ice or cold packs, heat or warm packs intermittently as needed to relieve pain.   Over the counter medication: Patient's preferred OTC medication as directed on packaging.  Prescription Medication as directed: Toradol and Gabapentin    She requested additional Toradol, noting that has been effective.  Did provide 1 additional prescription.   Also discussed use of gabapentin, she had used with some benefit in the past.  Will try again for this episode, prescription placed.   Pertinent potential adverse effect profile of prescribed medication(s) was  discussed with the patient, who expressed understanding.   She will call with an update on use of gabapentin in 7-10 days   Pertinent imaging of the area reviewed with the patient.   Activity Modification: as discussed   Rehab: Physical Therapy: referral placed; start as able; discussed inversion table/traction as well.  Advised trial of traction in physical therapy first, before purchasing an inversion table.  Discussed repeating corticosteroid injection.  She had good results in the past with JOSE, and desires to try again.  Referral placed.  Referral to a spine surgeon may be consideration given weakness on the right side; hold for now.  We will monitor this closely, and if not improving with other conservative management options, certainly reconsider referral.  Patient may call for a referral also, if she desires.  Referred to Pain Management for imaging corticosteroid injection  Follow up: 2-3 weeks after the injection for recheck, sooner if needed.  We discussed potentially concerning signs and symptoms related to the condition(s) listed above, including increase in pain, changing pain, increasing neurologic symptoms, and the patient was instructed to seek appropriate medical care if noted. All questions answered to patient's satisfaction. The patient expressed understanding of the plan.     Danny Tatum DO, CAQ    CC: Zeina Yañez         Disclaimer: This note consists of symbols derived from keyboarding, dictation and/or voice recognition software. As a result, there may be errors in the script that have gone undetected. Please consider this when interpreting information found in this chart.    The information in this document, created by the medical scribe for me, accurately reflects the services I personally performed and the decisions made by me. I have reviewed and approved this document for accuracy.   Danny Tatum DO, CAQ     Again, thank you for allowing me to participate in the  care of your patient.        Sincerely,        Danny Tatum, DO

## 2018-06-13 NOTE — PATIENT INSTRUCTIONS
Apply ice or cold packs, heat or warm packs intermittently as needed to relieve pain.     Referred for corticosteroid injection     Prescribed Toradol     Prescribed Gabapentin, call with an update in 7-10 days     Referred to physical therapy     Follow up 2-3 weeks after corticosteroid injection for recheck

## 2018-06-13 NOTE — PROGRESS NOTES
Sports Medicine Clinic Visit    PCP: Dagoberto Sykes (Inactive)    Marisa Randall is a 38 year old female who is seen  in consultation at the request of  Zeina Yañez PA-C presenting with low back pain.  Pain has been intermittent for many years.  Did have an incrase in pain recently and underwent an MRI.  Pain on the right side low and down her leg with numbness and tingling to her right foot.  She feels that all of her toes but her right small toe are numb.    Has and JOSE in the past, she believes it was about 6 years ago.  Has done PT, but difficulty with continuing her HEP currently.  Has been using a TENS unit.    Denies any issues with bowel or bladder control     **  Did not have any changes to activities prior to recent episode of right sided low back pain. She has had 3 different corticosteroid injections in the low back for previous episode. Pain at rest radiating from the low back, moving down the lateral aspect of the right lower extremity, down into the toes.     She tolerated gabapentin with last episode.    She has not tried traction before.     6/2011, L5-S1 interlaminar corticosteroid injection   9/2011 L5-S1 interlaminar corticosteroid injection   11/2013 L5-S1 interlaminar corticosteroid injection - which gave good relief.     Injury: ongoing     Location of Pain: right side low back and leg pain   Duration of Pain: 5.5 week(s) of increasing pain   Rating of Pain at worst: 8/10  Rating of Pain Currently: 6/10  Symptoms are better with: Nothing  Symptoms are worse with: laying down, movement, sneezing, coughing   Additional Features:   Positive: paresthesias and weakness   Negative: swelling, bruising, popping, grinding, catching, locking, instability, pain in other joints and systemic symptoms  Other evaluation and/or treatments so far consists of: MRI; in the past, she had injections noted above   prior History of related problems: ongoing     Social History: desk job     Marisa was  "asked to complete the Oswestry Low Back Disability Index .  today in the office.  Disability score: 57.78%.      Review of Systems  Musculoskeletal: as above  Remainder of review of systems is negative including constitutional, CV, pulmonary, GI, Skin and Neurologic except as noted in HPI or medical history.    This document serves as a record of the services and decisions personally performed and made by Danny Tatum DO, CAQ. It was created on his behalf by Nikunj Mulligan, a trained medical scribe. The creation of this document is based the provider's statements to the medical scribe.  Nikunj Mulligan June 13, 2018, 2:15 PM      Patient Active Problem List   Diagnosis     Carpal tunnel syndrome     Radial styloid tenosynovitis     Synovitis and tenosynovitis     Wrist tendonitis     Herniation of intervertebral disc between L4 and L5     PMHx: Above  PSHx: see chart    Family History   Problem Relation Age of Onset     DIABETES Mother      Colon Cancer Father      Social History     Social History     Marital status: Single     Spouse name: N/A     Number of children: N/A     Years of education: N/A     Occupational History     Not on file.     Social History Main Topics     Smoking status: Former Smoker     Smokeless tobacco: Never Used     Alcohol use Yes      Comment: rare     Drug use: No     Sexual activity: Not Currently     Birth control/ protection: IUD     Other Topics Concern     Not on file     Social History Narrative       Objective  /84  Ht 5' 7\" (1.702 m)  Wt 225 lb (102.1 kg)  BMI 35.24 kg/m2    GENERAL APPEARANCE: healthy, alert and no distress   GAIT: antalgic  SKIN: no suspicious lesions or rashes  NEURO: Normal strength and tone, mentation intact and speech normal  PSYCH:  mentation appears normal and affect normal/bright  HEENT: no scleral icterus  CV: no extremity edema   RESP: nonlabored breathing    Low back exam:    Inspection:       no visible deformity in the low back    "    normal skin       normal vascular       normal lymphatic    Strength:       hip flexion 5/5       knee extension 4+/5 on the right, 5/5 on the left        ankle dorsiflexion 4/5 on the right, 5/5 on the left        ankle plantarflexion 5/5 bilaterally        dorsiflexion of the great toe 5/5       knee flexion 5/5 bilaterally     Reflexes:       patellar (L3, L4) symmetric normal       achilles tendons (S1) symmetric normal    Sensation:      grossly intact throughout lower extremities    Skin:       well perfused       capillary refill brisk    Special tests:       Difficulty walking on toes, with pain in low back       Difficult to walk on heels on right    **      Radiology:  Visualized MRI of the lumbar spine from 6/11/2018, and reviewed images and report with patient.  MRI demonstrates L4-L5 and L5-S1 degenerative disc disease, with right-sided disc herniation L4-L5.    Results for orders placed or performed in visit on 06/11/18   MR Lumbar Spine w/o Contrast    Narrative    MRI LUMBAR SPINE WITHOUT CONTRAST June 11, 2018 5:20 PM     HISTORY: Back pain for three weeks; positive straight leg raise on  exam, thus far not improving with conservative treatment. Back strain,  subsequent encounter.    TECHNIQUE: Multiplanar multisequence MRI of the lumbar spine without  contrast.    COMPARISON: None.    FINDINGS: The report is dictated assuming five lumbar-type vertebral  bodies. Sagittal images demonstrate normal vertebral body height. Bone  marrow signal is unremarkable. Tip of the conus medullaris and cauda  equina are unremarkable.     T12-L1: No disc herniation or stenosis. Facet joints are unremarkable.    L1-L2: No disc herniation or stenosis. Facet joints are unremarkable.       L2-L3: No disc herniation or stenosis. Facet joints are unremarkable.    L3-L4: No disc herniation or stenosis. Facet joints are unremarkable.     L4-L5: There is a large right central disc herniation that protrudes  posteriorly  approximately 0.9 cm and measures approximately 1.2 cm at  its base. Severe central and right subarticular stenosis. Neural  foramen are patent. Mild left facet degenerative changes.    L5-S1: Degenerative disc disease with loss of disc space height. Mild  disc bulge. No central stenosis. Mild bilateral foraminal stenosis.    Paraspinous soft tissues: Unremarkable.      Impression    IMPRESSION:    1. At L4-L5 there is a large right central disc herniation that  results in severe central and right subarticular stenosis. Neural  foramen are patent.  2. Degenerative disc disease at L5-S1 without central stenosis. Mild  bilateral foraminal stenosis.    DIANDRA LARSON MD     Assessment:  1. Herniation of intervertebral disc between L4 and L5    2. Lumbar radiculopathy, right        Plan:  Discussed the assessment with the patient, her mother, and her daughter.    We discussed the following: symptom treatment, activity modification/rest, imaging, rehab, injection therapy, medication, traction/inversion table, and referral to spine surgeon. Following discussion, plan:   Topical Treatments: The patient is advised to apply ice or cold packs, heat or warm packs intermittently as needed to relieve pain.   Over the counter medication: Patient's preferred OTC medication as directed on packaging.  Prescription Medication as directed: Toradol and Gabapentin    She requested additional Toradol, noting that has been effective.  Did provide 1 additional prescription.   Also discussed use of gabapentin, she had used with some benefit in the past.  Will try again for this episode, prescription placed.   Pertinent potential adverse effect profile of prescribed medication(s) was discussed with the patient, who expressed understanding.   She will call with an update on use of gabapentin in 7-10 days   Pertinent imaging of the area reviewed with the patient.   Activity Modification: as discussed   Rehab: Physical Therapy: referral placed;  start as able; discussed inversion table/traction as well.  Advised trial of traction in physical therapy first, before purchasing an inversion table.  Discussed repeating corticosteroid injection.  She had good results in the past with JOSE, and desires to try again.  Referral placed.  Referral to a spine surgeon may be consideration given weakness on the right side; hold for now.  We will monitor this closely, and if not improving with other conservative management options, certainly reconsider referral.  Patient may call for a referral also, if she desires.  Referred to Pain Management for imaging corticosteroid injection  Follow up: 2-3 weeks after the injection for recheck, sooner if needed.  We discussed potentially concerning signs and symptoms related to the condition(s) listed above, including increase in pain, changing pain, increasing neurologic symptoms, and the patient was instructed to seek appropriate medical care if noted. All questions answered to patient's satisfaction. The patient expressed understanding of the plan.     Danny Tatum DO, CAQ    CC: Zeina Yañez         Disclaimer: This note consists of symbols derived from keyboarding, dictation and/or voice recognition software. As a result, there may be errors in the script that have gone undetected. Please consider this when interpreting information found in this chart.    The information in this document, created by the medical scribe for me, accurately reflects the services I personally performed and the decisions made by me. I have reviewed and approved this document for accuracy.   Danny Tatum DO, CAQ

## 2018-06-14 ENCOUNTER — TELEPHONE (OUTPATIENT)
Dept: PALLIATIVE MEDICINE | Facility: CLINIC | Age: 38
End: 2018-06-14

## 2018-06-14 ENCOUNTER — MYC MEDICAL ADVICE (OUTPATIENT)
Dept: ORTHOPEDICS | Facility: CLINIC | Age: 38
End: 2018-06-14

## 2018-06-14 NOTE — TELEPHONE ENCOUNTER
Pre-screening Questions for Radiology Injections:    Injection to be done at which interventional clinic site? [facility]   [facility] instruct patient to arrive 30 minutes before the scheduled appointment time.     Procedure ordered by Dr. Tatum    Procedure ordered? Lumbar Epidural Steroid Injection    What insurance would patient like us to bill for this procedure? [employer]      Worker's comp or MVA (motor vehicle accident) -Any injection DO NOT SCHEDULE and route to Olivia Hammer.      Polimetrix - For SI joint injections, DO NOT SCHEDULE and route Ena Giron. Novalere FP NO PA REQUIRED EFFECTIVE 11/1/2017      [employer]- MBB's must be scheduled at LEAST two weeks apart      [employer] - Any injection besides hip/shoulder/knee joint DO NOT SCHEDULE and route to Ena Giron. She will obtain PA and call pt back to schedule procedure or notify pt of denial.       [employer]-Route to Ena for review    Any chance of pregnancy? NO   If YES, do NOT schedule and route to RN pool    Is an  needed? No     Patient has a drive home? (mandatory) YES: INFORMED    Is patient taking any blood thinners (plavix, coumadin, jantoven, warfarin, heparin, pradaxa or dabigatran )? No   If hold needed, do NOT schedule, route to RN pool     Is patient taking any aspirin products? No     If more than 325mg/day do NOT schedule; route to RN pool     For CERVICAL procedures, hold all aspirin products for 6 days.      Does the patient have a bleeding or clotting disorder? No     If YES, okay to schedule AND route to RN nurse pool    **For any patients with platelet count <100, must be forwarded to provider**    Is patient diabetic?  No  If YES, have them bring their glucometer.    Does patient have an active infection or treated for one within the past week? No     Is patient currently taking any antibiotics?  No     For patients on chronic, preventative, or  prophylactic antibiotics, procedures may be scheduled.     For patients on antibiotics for active or recent infection:    Justin Benites Burton, Snitzer-antibiotic course must have been completed for 4 days    Is patient currently taking any steroid medications? (i.e. Prednisone, Medrol)  No     For patients on steroid medications:    Justin Benites Burton, Snitzer-steroid course must have been completed for 4 days    Reviewed with patient:  If you are started on any steroids or antibiotics between now and your appointment, you must contact us because it may affect our ability to perform your procedure.  Yes    Is patient actively being treated for cancer or immunocompromised? No  If YES, do NOT schedule and route to RN pool     Are you able to get on and off an exam table with minimal or no assistance? Yes  If NO, do NOT schedule and route to RN pool    Are you able to roll over and lay on your stomach with minimal or no assistance? Yes  If NO, do NOT schedule and route to RN pool     Any allergies to contrast dye, iodine, shellfish, or numbing and steroid medications? No  If YES, route to RN pool AND add allergy information to appointment notes    Allergies: Review of patient's allergies indicates no known allergies.      Has the patient had a flu shot or any other vaccinations within 7 days before or after the procedure.  No     Does patient have an MRI/CT?  YES: MRI  (SI joint, hip injections, lumbar sympathetic blocks, and stellate ganglion blocks do not require an MRI)    Was the MRI done w/in the last 3 years?  Yes    Was MRI done at [place]? Yes      If not, where was it done? N/A       If MRI was not [facility] at [place], [facility] do NOT schedule and route to nursing.  If pt has an imaging disc, the injection may be scheduled but pt has to bring disc to appt. If they show up w/out disc the injection cannot be done    Reminders (please tell patient if applicable):        Instructed pt to arrive 30 minutes early for IV start if this is for a cervical procedure, ALL sympathetic (stellate ganglion, hypogastric, or lumbar sympathetic block) and all sedation procedures (RFA, spinal cord stimulation trials).  Not Applicable   -IVs are not routinely placed for Dr. Armendariz cervical cases   -Dr. Izaguirre: IVs for cervical ESIs and cervical TBDs (not CMBBs/facet inj)      If NPO for sedation, informed patient that it is okay to take medications with sips of water (except if they are to hold blood thinners).  Not Applicable   *DO take blood pressure medication if it is prescribed*      If this is for a cervical JOSE, informed patient that aspirin needs to be held for 6 days.   Not Applicable      For all patients not having spinal cord stimulator (SCS) trials or radiofrequency ablations (RFAs), informed patient:    IV sedation is not provided for this procedure.  If you feel that an oral anti-anxiety medication is needed, you can discuss this further with your referring provider or primary care provider.  The Pain Clinic provider will discuss specifics of what the procedure includes at your appointment.  Most procedures last 10-20 minutes.  We use numbing medications to help with any discomfort during the procedure.  Not Applicable      Do not schedule procedures requiring IV placement in the first appointment of the day or first appointment after lunch. Do NOT schedule at 0745, 0815 or 1245. N/A      For patients 85 or older we recommend having an adult stay w/ them for the remainder of the day.   N/A    Does the patient have any questions?  NO  Julieta Randhawa  [facility]

## 2018-06-14 NOTE — TELEPHONE ENCOUNTER
Left voicemail for patient to schedule Lumbar JOSE.        Olivia AMAYA    [facility]

## 2018-06-18 ENCOUNTER — RADIANT APPOINTMENT (OUTPATIENT)
Dept: RADIOLOGY | Facility: CLINIC | Age: 38
End: 2018-06-18
Attending: ANESTHESIOLOGY
Payer: COMMERCIAL

## 2018-06-18 ENCOUNTER — RADIOLOGY INJECTION OFFICE VISIT (OUTPATIENT)
Dept: PALLIATIVE MEDICINE | Facility: CLINIC | Age: 38
End: 2018-06-18
Attending: PEDIATRICS
Payer: COMMERCIAL

## 2018-06-18 VITALS — SYSTOLIC BLOOD PRESSURE: 117 MMHG | HEART RATE: 73 BPM | DIASTOLIC BLOOD PRESSURE: 83 MMHG | OXYGEN SATURATION: 98 %

## 2018-06-18 DIAGNOSIS — M54.16 LUMBAR RADICULOPATHY: ICD-10-CM

## 2018-06-18 DIAGNOSIS — M54.16 LUMBAR RADICULOPATHY: Primary | ICD-10-CM

## 2018-06-18 DIAGNOSIS — M51.26 HERNIATION OF INTERVERTEBRAL DISC BETWEEN L4 AND L5: ICD-10-CM

## 2018-06-18 DIAGNOSIS — M99.43 CONNECTIVE TISSUE STENOSIS OF NEURAL CANAL OF LUMBAR REGION: ICD-10-CM

## 2018-06-18 PROCEDURE — 64483 NJX AA&/STRD TFRM EPI L/S 1: CPT | Mod: RT | Performed by: ANESTHESIOLOGY

## 2018-06-18 PROCEDURE — 64484 NJX AA&/STRD TFRM EPI L/S EA: CPT | Mod: RT | Performed by: ANESTHESIOLOGY

## 2018-06-18 ASSESSMENT — PAIN SCALES - GENERAL: PAINLEVEL: EXTREME PAIN (8)

## 2018-06-18 NOTE — MR AVS SNAPSHOT
After Visit Summary   6/18/2018    Marisa Randall    MRN: [id number]           Patient Information     Date Of Birth          1980        Visit Information        Provider Department      6/18/2018 8:45 AM Hilario Soria MD [facility]        Care Instructions    [facility]   Procedure Discharge Instructions    Today you saw:    Dr. Hilario Carter      You had an:  Epidural steroid injection   -lumbar     Medications used:  Lidocaine   Bupivacaine   Dexamethasone Depo-medrol  Omnipaque  Normal saline          Be cautious when walking. Numbness and/or weakness in the lower extremities may occur for up to 6-8 hours after the procedure due to effect of the local anesthetic    Do not drive for 6 hours. The effect of the local anesthetic could slow your reflexes.     You may resume your regular activities after 24 hours    Avoid strenuous activity for the first 24 hours    You may shower, however avoid swimming, tub baths or hot tubs for 24 hours following your procedure    You may have a mild to moderate increase in pain for several days following the injection.    It may take up to 14 days for the steroid medication to start working although you may feel the effect as early as a few days after the procedure.       You may use ice packs for 10-15 minutes, 3 to 4 times a day at the injection site for comfort    Do not use heat to painful areas for 6 to 8 hours. This will give the local anesthetic time to wear off and prevent you from accidentally burning your skin.     You may use anti-inflammatory medications (such as Ibuprofen or Aleve or Advil) or Tylenol for pain control if necessary    If you were fasting, you may resume your normal diet and medications after the procedure    If you have diabetes, check your blood sugar more frequently than usual as your blood sugar may be higher than normal for 10-14 days following a steroid injection. Contact your  doctor who manages your diabetes if your blood sugar is higher than usual    If you experience any of the following, call the Pain Clinic during work hours at [phone] or the Provider Line after hours at [phone]:  -Fever over 100 degree F  -Swelling, bleeding, redness, drainage, warmth at the injection site  -Progressive weakness or numbness in your legs or arms  -Loss of bowel or bladder function  -Unusual headache that is not relieved by Tylenol or other pain reliever  -Unusual new onset of pain that is not improving                Follow-ups after your visit        Who to contact     If you have questions or need follow up information about today's clinic visit or your schedule please contact [facility] directly at [phone].  Normal or non-critical lab and imaging results will be communicated to you by [facility], letter or phone within 4 business days after the clinic has received the results. If you do not hear from us within 7 days, please contact the clinic through [facility] or phone. If you have a critical or abnormal lab result, we will notify you by phone as soon as possible.  Submit refill requests through [employer] or call your pharmacy and they will forward the refill request to us. Please allow 3 business days for your refill to be completed.          Additional Information About Your Visit        [facility] Information     [employer] gives you secure access to your electronic health record. If you see a primary care provider, you can also send messages to your care team and make appointments. If you have questions, please call your primary care clinic.  If you do not have a primary care provider, please call [phone] and they will assist you.        Care EveryWhere ID     This is your Care EveryWhere ID. This could be used by other organizations to access your [place] medical records  [id number]        Your Vitals Were     Pulse                   93            Blood Pressure from  Last 3 Encounters:   06/18/18 119/87   06/13/18 128/84   06/06/18 123/81    Weight from Last 3 Encounters:   06/13/18 102.1 kg (225 lb)   06/06/18 101.6 kg (224 lb)   05/14/18 101.6 kg (224 lb)              Today, you had the following     No orders found for display       Primary Care Provider Office Phone # Fax #    Dagoberto Sykes [phone]       [facility] [address]  [facility] [address]        Equal Access to Services     [facility]: Hadii aad ku hadasho Soomaali, waaxda luqadaha, qaybta kaalmada adeegyada, waxmarleny tony . So [facility] [phone].    ATENCIÓN: Si habla español, tiene a salinas disposición servicios gratuitos de asistencia lingüística. [facility] [phone].    We comply with applicable federal civil rights laws and [place] laws. We do not discriminate on the basis of race, color, national origin, age, disability, sex, sexual orientation, or gender identity.            Thank you!     Thank you for choosing [facility]  for your care. Our goal is always to provide you with excellent care. Hearing back from our patients is one way we can continue to improve our services. Please take a few minutes to complete the written survey that you may receive in the mail after your visit with us. Thank you!             Your Updated Medication List - Protect others around you: Learn how to safely use, store and throw away your medicines at www.disposemymeds.org.          This list is accurate as of 6/18/18  9:32 AM.  Always use your most recent med list.                   Brand Name Dispense Instructions for use Diagnosis    gabapentin 300 MG capsule    NEURONTIN    90 capsule    Take 1 tablet (300 mg) every night for 1-3 days, then 1 tablet twice daily for 1-3 days, then 1 tablet three times daily    Lumbar radiculopathy, right       HYDROcodone-acetaminophen 5-325 MG per tablet    NORCO    20 tablet    Take 1 tablet by mouth every 4 hours as  needed for pain    Acute left-sided low back pain with left-sided sciatica       ketorolac 10 MG tablet    TORADOL    20 tablet    Take 1 tablet (10 mg) by mouth every 6 hours as needed    Lumbar radiculopathy, right       methocarbamol 500 MG tablet    ROBAXIN    30 tablet    Take 2 tablets (1,000 mg) by mouth 3 times daily as needed for muscle spasms    Back strain, subsequent encounter       MIRENA IU      by Intrauterine route.

## 2018-06-18 NOTE — PATIENT INSTRUCTIONS
[facility]   Procedure Discharge Instructions    Today you saw:    Dr. Hilario Carter      You had an:  Epidural steroid injection   -lumbar     Medications used:  Lidocaine   Bupivacaine   Dexamethasone Depo-medrol  Omnipaque  Normal saline          Be cautious when walking. Numbness and/or weakness in the lower extremities may occur for up to 6-8 hours after the procedure due to effect of the local anesthetic    Do not drive for 6 hours. The effect of the local anesthetic could slow your reflexes.     You may resume your regular activities after 24 hours    Avoid strenuous activity for the first 24 hours    You may shower, however avoid swimming, tub baths or hot tubs for 24 hours following your procedure    You may have a mild to moderate increase in pain for several days following the injection.    It may take up to 14 days for the steroid medication to start working although you may feel the effect as early as a few days after the procedure.       You may use ice packs for 10-15 minutes, 3 to 4 times a day at the injection site for comfort    Do not use heat to painful areas for 6 to 8 hours. This will give the local anesthetic time to wear off and prevent you from accidentally burning your skin.     You may use anti-inflammatory medications (such as Ibuprofen or Aleve or Advil) or Tylenol for pain control if necessary    If you were fasting, you may resume your normal diet and medications after the procedure    If you have diabetes, check your blood sugar more frequently than usual as your blood sugar may be higher than normal for 10-14 days following a steroid injection. Contact your doctor who manages your diabetes if your blood sugar is higher than usual    If you experience any of the following, call the Pain Clinic during work hours at [phone] or the Provider Line after hours at [phone]:  -Fever over 100 degree F  -Swelling, bleeding, redness, drainage, warmth at the  injection site  -Progressive weakness or numbness in your legs or arms  -Loss of bowel or bladder function  -Unusual headache that is not relieved by Tylenol or other pain reliever  -Unusual new onset of pain that is not improving

## 2018-06-18 NOTE — NURSING NOTE
Discharge Information    IV Discontiued Time:  NA    Amount of Fluid Infused:  NA    Discharge Criteria = When patient returns to baseline or as per MD order    Consciousness:  Pt is fully awake    Circulation:  BP +/- 20% of pre-procedure level    Respiration:  Patient is able to breathe deeply    O2 Sat:  Patient is able to maintain O2 Sat >92% on room air    Activity:  Moves 4 extremities on command    Ambulation:  Patient is able to stand and walk or stand and pivot into wheelchair    Dressing:  Clean/dry or No Dressing    Notes:   Discharge instructions and AVS given to patient    Patient meets criteria for discharge?  YES    Admitted to PCU?  No    Responsible adult present to accompany patient home?  Yes    Signature/Title:    aleja hammer RN Care Coordinator  [facility]

## 2018-06-18 NOTE — PROGRESS NOTES
"Pre procedure Diagnosis: lumbar radiculopathy, lumbar degenerative disc disease   Post procedure Diagnosis: Same  Procedure performed: lumbar transforaminal epidural steroid injection at L4-5 and L5-S1 on the right, fluoroscopically guided, contrast controlled  Anesthesia: none  Complications: none  Operators: Hilario Carter MD, Bambi Vazquez MD (pain fellow)    Indications:   Marisa Randall is a 38 year old female was sent by Dr. Danny Tatum for lumbar epidural steroid injection.  They have a history of chronic right lower back pain with pain radiating down the right lower extremity into the top of the foot.  Exam shows antalgic gait, lumbar tenderness, 5/5 strength and preserved sensation to light touch and positive SLR and they have tried conservative treatment including medications.    MRI was done on 6/11/2018 which showed   \"IMPRESSION:    1. At L4-L5 there is a large right central disc herniation that  results in severe central and right subarticular stenosis. Neural  foramen are patent.  2. Degenerative disc disease at L5-S1 without central stenosis. Mild  bilateral foraminal stenosis.\"    Options/alternatives, benefits and risks were discussed with the patient including bleeding, infection, tissue trauma, numbness, weakness, paralysis, spinal cord injury, radiation exposure, headache and reaction to medications. Questions were answered to her satisfaction and she agrees to proceed. Voluntary informed consent was obtained and signed.     Vitals were reviewed: Yes  /87  Pulse 93  Allergies were reviewed:  Yes   Medications were reviewed:  Yes   Pre-procedure pain score: 7/10    Procedure:  After getting informed consent, patient was brought into the procedure suite and was placed in a prone position on the procedure table.   A Pause for the Cause was performed.  Patient was prepped and draped in sterile fashion.     After identifying the right L4-5 and L5-S1 neuroforamen, the C-arm was rotated " to a right lateral oblique angle.  A total of 3ml of Lidocaine 1% was used to anesthetize the skin and the needle track at both skin entry sites coaxial with the fluoroscopy beam, and overriding the superior aspect of the neuroforamen.  25 gauge 5 inch spinal needles were advanced under intermittent fluoroscopy until they entered the foramen superiorly beneath the pedicles.    The needle positions were then inspected from anteroposterior and lateral views, and the needles adjusted appropriately.  Aspiration was negative at both levels.  A total of 1ml of Omnipaque-300 was injected, confirming appropriate needle positions, with spread into the nerve root sheath and the epidural space at both levels, with no intravascular uptake. 9ml was wasted    Then, after repeated negative aspiration, each level was injected with 2.5 ml of a combination of Depomedrol 40 mg, Decadron 5 mg, 0.5% bupivacaine 1.5 ml, diluted with 2 ml of normal saline (total injectate volume 5 ml) and the needles were removed.    During the procedure, there was a paresthesia described as a pressure sensation with instillation of medication.  Hemostasis was achieved, the area was cleaned, and bandaids were placed when appropriate.  The patient tolerated the procedure well, and was taken to the recovery room.    Images were saved to PACS.    Post-procedure pain score: 6/10  Follow-up includes:   -f/u phone call in one week  -f/u with referring provider    Hilario Carter MD  [facility]

## 2018-06-18 NOTE — NURSING NOTE
Pre-procedure Intake    Have you been fasting? NA    If yes, for how long? NA    Are you taking a prescribed blood thinner such as coumadin, Plavix, Xarelto?    No    If yes, when did you take your last dose? NA    Do you take aspirin?  No    If cervical procedure, have you held aspirin for 6 days?   NA    Do you have any allergies to contrast dye, iodine, steroid and/or numbing medications?  NO    Are you currently taking antibiotics or have an active infection?  NO    Have you had a fever/elevated temperature within the past week? NO    Are you currently taking oral steroids? NO    Do you have a ? Yes       Are you pregnant or breastfeeding?  NO    Are the vital signs normal?  Yes      Gabby Hubbard CMA ([facility])

## 2018-06-26 ENCOUNTER — TELEPHONE (OUTPATIENT)
Dept: PALLIATIVE MEDICINE | Facility: CLINIC | Age: 38
End: 2018-06-26

## 2018-06-26 NOTE — TELEPHONE ENCOUNTER
Patient had a  lumbar transforaminal epidural steroid injection at L4-5 and L5-S1 on the right on 6/18/18.  Called patient for an update.      Left message that we were calling for an update about how she was doing after the injection.  LM that if she has any problems or questions to call the clinic at [phone].

## 2018-09-23 ENCOUNTER — RADIANT APPOINTMENT (OUTPATIENT)
Dept: GENERAL RADIOLOGY | Facility: CLINIC | Age: 38
End: 2018-09-23
Attending: FAMILY MEDICINE
Payer: COMMERCIAL

## 2018-09-23 ENCOUNTER — OFFICE VISIT (OUTPATIENT)
Dept: URGENT CARE | Facility: URGENT CARE | Age: 38
End: 2018-09-23
Payer: COMMERCIAL

## 2018-09-23 VITALS
OXYGEN SATURATION: 96 % | RESPIRATION RATE: 15 BRPM | TEMPERATURE: 98.4 F | BODY MASS INDEX: 35.08 KG/M2 | WEIGHT: 224 LBS | SYSTOLIC BLOOD PRESSURE: 96 MMHG | DIASTOLIC BLOOD PRESSURE: 66 MMHG | HEART RATE: 126 BPM

## 2018-09-23 DIAGNOSIS — J20.9 BRONCHITIS WITH BRONCHOSPASM: ICD-10-CM

## 2018-09-23 DIAGNOSIS — J01.90 ACUTE SINUSITIS WITH COEXISTING CONDITION REQUIRING PROPHYLACTIC TREATMENT: Primary | ICD-10-CM

## 2018-09-23 DIAGNOSIS — R05.9 COUGH: ICD-10-CM

## 2018-09-23 PROCEDURE — 99214 OFFICE O/P EST MOD 30 MIN: CPT | Performed by: FAMILY MEDICINE

## 2018-09-23 PROCEDURE — 71046 X-RAY EXAM CHEST 2 VIEWS: CPT | Mod: FY

## 2018-09-23 RX ORDER — PREDNISONE 20 MG/1
TABLET ORAL
Qty: 18 TABLET | Refills: 0 | Status: SHIPPED | OUTPATIENT
Start: 2018-09-23 | End: 2022-11-29

## 2018-09-23 NOTE — PROGRESS NOTES
Chief complaint: cough    About a week ago started having a cough and congestion and sinus congestion  Cough getting worse  Stayed home for a couple of days  Denies any possibility of pregnancy declined a pregnancy test  Fever felt feverish and chills   Sinus congestion/sinus pain Yes  Wheezing: yes   Chest pain or exertional shortness of breath: NO   Exposure to pertussis or pertussis like symptoms: No  Orthopnea, worsening edema, pnd: NO  Rash: NO  Tried OTC medications without relief  No hemoptysis.  Worsening symptoms hence patient came in to be seen     Problem list and histories reviewed & adjusted, as indicated.  Additional history: as documented    Problem list, Medication list, Allergies, and Medical/Social/Surgical histories reviewed in EPIC and updated as appropriate.    ROS:  Constitutional, HEENT, cardiovascular, pulmonary, gi and gu systems are negative, except as otherwise noted.    OBJECTIVE:                                                    BP 96/66  Pulse 126  Temp 98.4  F (36.9  C)  Resp 15  Wt 224 lb (101.6 kg)  SpO2 96%  BMI 35.08 kg/m2  Body mass index is 35.08 kg/(m^2).  GENERAL: healthy, alert and no distress  EYES: pink palpebral conjunctiva, anicteric sclera  ENT: midline nasal septum normal ear exam. congested sinuses.   Mouth: moist buccal mucosa nonhyperemic posterior pharyngeal wall. No tonsillar enlargement or cellulitis  NECK: no adenopathy, no asymmetry, masses, or scars and thyroid normal to palpation  RESP: symmetrical chest expansion no retractions. Occasional end expiratory wheeze. Harsh breath sounds bilaterally all lung fields   CV: regular rate and rhythm, normal S1 S2, no S3 or S4,  No murmurs, click or rub  SKIN: no visible rashes noted  Pscyh: Appropriate mood and affect  MS: no gross musculoskeletal defects noted    Diagnostic Test Results:  Results for orders placed or performed in visit on 09/23/18 (from the past 24 hour(s))   XR Chest 2 Views    Narrative    XR  CHEST 2 VW   9/23/2018 3:01 PM     HISTORY: ; Cough    COMPARISON: None.    FINDINGS: The heart is negative.  The lungs are clear. The pulmonary  vasculature is normal.  The bones and soft tissues are unremarkable.      Impression    IMPRESSION: No active alveolar-type infiltrates are identified.        JANETTE CARCAMO MD        ASSESSMENT/PLAN:                                                        ICD-10-CM    1. Acute sinusitis with coexisting condition requiring prophylactic treatment J01.90 amoxicillin-clavulanate (AUGMENTIN) 875-125 MG per tablet   2. Bronchitis with bronchospasm J20.9 Spacer/Aero-Holding Chambers (SPACER/AERO-HOLD CHAMBER MASK) LEXA     predniSONE (DELTASONE) 20 MG tablet   3. Cough R05 XR Chest 2 Views     predniSONE (DELTASONE) 20 MG tablet     Prescribed with augmentin  Side effects discussed warned about GI side effects and risk of cdiff.  Patient has albuterol at home to use for wheezing. Prescribed with spacer   Prescribed with prednisone taper   Adverse reactions of medications discussed.  Over the counter medications discussed.   Aware to come back in if with worsening symptoms or if no relief despite treatment plan  Patient voiced understanding and had no further questions.     MD Terra Miranda MD  [facility]

## 2018-09-23 NOTE — MR AVS SNAPSHOT
After Visit Summary   9/23/2018    Marisa Randall    MRN: [id number]           Patient Information     Date Of Birth          1980        Visit Information        Provider Department      9/23/2018 2:00 PM Terra Hawthorne MD [facility]        Today's Diagnoses     Acute sinusitis with coexisting condition requiring prophylactic treatment    -  1    Bronchitis with bronchospasm        Cough           Follow-ups after your visit        Who to contact     If you have questions or need follow up information about today's clinic visit or your schedule please contact [facility] directly at [phone].  Normal or non-critical lab and imaging results will be communicated to you by [employer] Logisticshart, letter or phone within 4 business days after the clinic has received the results. If you do not hear from us within 7 days, please contact the clinic through [employer] Log[employer] or phone. If you have a critical or abnormal lab result, we will notify you by phone as soon as possible.  Submit refill requests through Sirna[employer] or call your pharmacy and they will forward the refill request to us. Please allow 3 business days for your refill to be completed.          Additional Information About Your Visit        MyChart Information     [employer] gives you secure access to your electronic health record. If you see a primary care provider, you can also send messages to your care team and make appointments. If you have questions, please call your primary care clinic.  If you do not have a primary care provider, please call [phone] and they will assist you.        Care EveryWhere ID     This is your Care EveryWhere ID. This could be used by other organizations to access your [place] medical records  [id number]        Your Vitals Were     Pulse Temperature Respirations Pulse Oximetry BMI (Body Mass Index)       126 98.4  F (36.9  C) 15 96% 35.08 kg/m2        Blood Pressure from Last 3 Encounters:    09/23/18 96/66   06/18/18 117/83   06/13/18 128/84    Weight from Last 3 Encounters:   09/23/18 224 lb (101.6 kg)   06/13/18 225 lb (102.1 kg)   06/06/18 224 lb (101.6 kg)              We Performed the Following     XR Chest 2 Views          Today's Medication Changes          These changes are accurate as of 9/23/18  3:58 PM.  If you have any questions, ask your nurse or doctor.               Start taking these medicines.        Dose/Directions    amoxicillin-clavulanate 875-125 MG per tablet   Commonly known as:  AUGMENTIN   Used for:  Acute sinusitis with coexisting condition requiring prophylactic treatment   Started by:  Terra Hawthorne MD        Dose:  1 tablet   Take 1 tablet by mouth 2 times daily for 7 days   Quantity:  14 tablet   Refills:  0       predniSONE 20 MG tablet   Commonly known as:  DELTASONE   Used for:  Bronchitis with bronchospasm, Cough   Started by:  Terra Hawthorne MD        20mg/daily: 3 tablets the first 3 days, then 2 tabs the next 3 days, then 1 tab daily for the last 3 days   Quantity:  18 tablet   Refills:  0       spacer/aero-hold chamber mask Rylie   Used for:  Bronchitis with bronchospasm   Started by:  Terra Hawthorne MD        1 Adult size spacer to use with MDI inhalers.   Quantity:  1 each   Refills:  0            Where to get your medicines      These medications were sent to [facility] [address] [place], [place] - [address]  [address] [address]     Phone:  [phone]     amoxicillin-clavulanate 875-125 MG per tablet    predniSONE 20 MG tablet    spacer/aero-hold chamber mask Rylie                Primary Care Provider Office Phone # Fax #    Dagoberto Sykes [phone]       [facility] [address] [facility] [address]        Equal Access to Services     EMANI MAIN AH: Gricelda Benavidez, waaxda luqadaha, qaybta kaalmatierra osorio, ema shah. So  [facility] [phone].    ATENCIÓN: Si yoko faustin, tiene a salinas disposición servicios gratuitos de asistencia lingüística. Rocío pugh [phone].    We comply with applicable federal civil rights laws and [place] laws. We do not discriminate on the basis of race, color, national origin, age, disability, sex, sexual orientation, or gender identity.            Thank you!     Thank you for choosing [facility]  for your [facility]. Our goal is always to provide you with excellent care. Hearing back from our patients is one way we can continue to improve our services. Please take a few minutes to complete the written survey that you may receive in the mail after your visit with us. Thank you!             Your Updated Medication List - Protect others around you: Learn how to safely use, store and throw away your medicines at www.disposemymeds.org.          This list is accurate as of 9/23/18  3:58 PM.  Always use your most recent med list.                   Brand Name Dispense Instructions for use Diagnosis    amoxicillin-clavulanate 875-125 MG per tablet    AUGMENTIN    14 tablet    Take 1 tablet by mouth 2 times daily for 7 days    Acute sinusitis with coexisting condition requiring prophylactic treatment       gabapentin 300 MG capsule    NEURONTIN    90 capsule    Take 1 tablet (300 mg) every night for 1-3 days, then 1 tablet twice daily for 1-3 days, then 1 tablet three times daily    Lumbar radiculopathy, right       HYDROcodone-acetaminophen 5-325 MG per tablet    NORCO    20 tablet    Take 1 tablet by mouth every 4 hours as needed for pain    Acute left-sided low back pain with left-sided sciatica       ketorolac 10 MG tablet    TORADOL    20 tablet    Take 1 tablet (10 mg) by mouth every 6 hours as needed    Lumbar radiculopathy, right       methocarbamol 500 MG tablet    ROBAXIN    30 tablet    Take 2 tablets (1,000 mg) by mouth 3 times daily as needed for muscle spasms    Back strain, subsequent encounter        MIRENA IU      by Intrauterine route.        predniSONE 20 MG tablet    DELTASONE    18 tablet    20mg/daily: 3 tablets the first 3 days, then 2 tabs the next 3 days, then 1 tab daily for the last 3 days    Bronchitis with bronchospasm, Cough       spacer/aero-hold chamber mask Rylie     1 each    1 Adult size spacer to use with MDI inhalers.    Bronchitis with bronchospasm

## 2018-11-07 ENCOUNTER — ALLIED HEALTH/NURSE VISIT (OUTPATIENT)
Dept: NURSING | Facility: CLINIC | Age: 38
End: 2018-11-07
Payer: COMMERCIAL

## 2018-11-07 DIAGNOSIS — Z23 NEED FOR PROPHYLACTIC VACCINATION AND INOCULATION AGAINST INFLUENZA: Primary | ICD-10-CM

## 2018-11-07 PROCEDURE — 90686 IIV4 VACC NO PRSV 0.5 ML IM: CPT

## 2018-11-07 PROCEDURE — 99207 ZZC NO CHARGE NURSE ONLY: CPT

## 2018-11-07 PROCEDURE — 90471 IMMUNIZATION ADMIN: CPT

## 2018-11-07 NOTE — MR AVS SNAPSHOT
After Visit Summary   11/7/2018    Marisa Randall    MRN: [id number]           Patient Information     Date Of Birth          1980        Visit Information        Provider Department      11/7/2018 3:15 PM BE [facility]        Today's Diagnoses     Need for prophylactic vaccination and inoculation against influenza    -  1       Follow-ups after your visit        Who to contact     If you have questions or need follow up information about today's clinic visit or your schedule please contact [facility] directly at [phone].  Normal or non-critical lab and imaging results will be communicated to you by GreenSQLhart, letter or phone within 4 business days after the clinic has received the results. If you do not hear from us within 7 days, please contact the clinic through Fl[employer]et or phone. If you have a critical or abnormal lab result, we will notify you by phone as soon as possible.  Submit refill requests through [employer] or call your pharmacy and they will forward the refill request to us. Please allow 3 business days for your refill to be completed.          Additional Information About Your Visit        MyChart Information     [employer] gives you secure access to your electronic health record. If you see a primary care provider, you can also send messages to your care team and make appointments. If you have questions, please call your primary care clinic.  If you do not have a primary care provider, please call [phone] and they will assist you.        Care EveryWhere ID     This is your Care EveryWhere ID. This could be used by other organizations to access your [facility] medical records  [id number]         Blood Pressure from Last 3 Encounters:   09/23/18 96/66   06/18/18 117/83   06/13/18 128/84    Weight from Last 3 Encounters:   09/23/18 224 lb (101.6 kg)   06/13/18 225 lb (102.1 kg)   06/06/18 224 lb (101.6 kg)              We Performed the Following      FLU VACCINE, SPLIT VIRUS, IM (QUADRIVALENT) [56904]- >3 YRS     Vaccine Administration, Initial [34708]        Primary Care Provider Office Phone # Fax #    Dagoberto Sykes [phone]       [facility] [address]  [facility] [address]        Equal Access to Services     [facility] JOSE DAVID : Hadii aad ku hadasho Soomaali, waaxda luqadaha, qaybta kaalmada adeegyada, waxay angelesin hayaan adecris jaquelinjose tony . So [facility] [phone].    ATENCIÓN: Si habla español, tiene a salinas disposición servicios gratuitos de asistencia lingüística. [facility] [phone].    We comply with applicable federal civil rights laws and [place] laws. We do not discriminate on the basis of race, color, national origin, age, disability, sex, sexual orientation, or gender identity.            Thank you!     Thank you for choosing [facility]  for your care. Our goal is always to provide you with excellent care. Hearing back from our patients is one way we can continue to improve our services. Please take a few minutes to complete the written survey that you may receive in the mail after your visit with us. Thank you!             Your Updated Medication List - Protect others around you: Learn how to safely use, store and throw away your medicines at www.disposemymeds.org.          This list is accurate as of 11/7/18 11:59 PM.  Always use your most recent med list.                   Brand Name Dispense Instructions for use Diagnosis    gabapentin 300 MG capsule    NEURONTIN    90 capsule    Take 1 tablet (300 mg) every night for 1-3 days, then 1 tablet twice daily for 1-3 days, then 1 tablet three times daily    Lumbar radiculopathy, right       HYDROcodone-acetaminophen 5-325 MG per tablet    NORCO    20 tablet    Take 1 tablet by mouth every 4 hours as needed for pain    Acute left-sided low back pain with left-sided sciatica       ketorolac 10 MG tablet    TORADOL    20 tablet    Take 1 tablet (10 mg) by mouth  every 6 hours as needed    Lumbar radiculopathy, right       methocarbamol 500 MG tablet    ROBAXIN    30 tablet    Take 2 tablets (1,000 mg) by mouth 3 times daily as needed for muscle spasms    Back strain, subsequent encounter       MIRENA IU      by Intrauterine route.        predniSONE 20 MG tablet    DELTASONE    18 tablet    20mg/daily: 3 tablets the first 3 days, then 2 tabs the next 3 days, then 1 tab daily for the last 3 days    Bronchitis with bronchospasm, Cough       spacer/aero-hold chamber mask Rylie     1 each    1 Adult size spacer to use with MDI inhalers.    Bronchitis with bronchospasm

## 2018-11-09 NOTE — PROGRESS NOTES

## 2018-11-11 ENCOUNTER — HEALTH MAINTENANCE LETTER (OUTPATIENT)
Age: 38
End: 2018-11-11

## 2018-11-12 ENCOUNTER — TELEPHONE (OUTPATIENT)
Dept: OBGYN | Facility: CLINIC | Age: 38
End: 2018-11-12

## 2018-11-12 NOTE — TELEPHONE ENCOUNTER
Patient wants an appointment to get her Mirena taken out and a new one inserted. At that same appointment she would like to get a pap as well. Please call to set up procedure. Thank you

## 2018-11-14 NOTE — TELEPHONE ENCOUNTER
Patient returned call and appointment was scheduled.  Patient is pleased.  Donna Barrios, LUCIA  November 14, 2018 5:18 PM

## 2018-11-14 NOTE — TELEPHONE ENCOUNTER
[facility] Call Center    Phone Message    May a detailed message be left on voicemail: yes    Reason for Call: Other: Patient is calling schedule procedure at [place]. Please advise.      Action Taken: Message routed to:  [facility] p [phone]

## 2018-12-05 ENCOUNTER — OFFICE VISIT (OUTPATIENT)
Dept: OBGYN | Facility: CLINIC | Age: 38
End: 2018-12-05
Payer: COMMERCIAL

## 2018-12-05 VITALS
TEMPERATURE: 98.1 F | DIASTOLIC BLOOD PRESSURE: 71 MMHG | BODY MASS INDEX: 35.64 KG/M2 | HEIGHT: 67 IN | OXYGEN SATURATION: 96 % | HEART RATE: 81 BPM | SYSTOLIC BLOOD PRESSURE: 105 MMHG | WEIGHT: 227.1 LBS

## 2018-12-05 DIAGNOSIS — Z30.430 ENCOUNTER FOR IUD INSERTION: ICD-10-CM

## 2018-12-05 DIAGNOSIS — Z00.00 ANNUAL PHYSICAL EXAM: Primary | ICD-10-CM

## 2018-12-05 DIAGNOSIS — Z12.4 SCREENING FOR CERVICAL CANCER: ICD-10-CM

## 2018-12-05 DIAGNOSIS — Z13.29 SCREENING FOR THYROID DISORDER: ICD-10-CM

## 2018-12-05 DIAGNOSIS — O09.219 PREGNANCY WITH HISTORY OF PRE-TERM LABOR, UNSPECIFIED TRIMESTER: ICD-10-CM

## 2018-12-05 DIAGNOSIS — Z13.220 LIPID SCREENING: ICD-10-CM

## 2018-12-05 DIAGNOSIS — Z13.1 SCREENING FOR DIABETES MELLITUS: ICD-10-CM

## 2018-12-05 DIAGNOSIS — Z30.432 ENCOUNTER FOR IUD REMOVAL: ICD-10-CM

## 2018-12-05 LAB — BETA HCG QUAL IFA URINE: NEGATIVE

## 2018-12-05 PROCEDURE — 84703 CHORIONIC GONADOTROPIN ASSAY: CPT | Performed by: OBSTETRICS & GYNECOLOGY

## 2018-12-05 PROCEDURE — 87624 HPV HI-RISK TYP POOLED RSLT: CPT | Performed by: OBSTETRICS & GYNECOLOGY

## 2018-12-05 PROCEDURE — G0145 SCR C/V CYTO,THINLAYER,RESCR: HCPCS | Performed by: OBSTETRICS & GYNECOLOGY

## 2018-12-05 PROCEDURE — 58301 REMOVE INTRAUTERINE DEVICE: CPT | Performed by: OBSTETRICS & GYNECOLOGY

## 2018-12-05 PROCEDURE — 99385 PREV VISIT NEW AGE 18-39: CPT | Mod: 25 | Performed by: OBSTETRICS & GYNECOLOGY

## 2018-12-05 PROCEDURE — 58300 INSERT INTRAUTERINE DEVICE: CPT | Performed by: OBSTETRICS & GYNECOLOGY

## 2018-12-05 RX ORDER — LORATADINE 10 MG/1
10 TABLET ORAL
COMMUNITY
Start: 2011-03-08

## 2018-12-05 ASSESSMENT — PATIENT HEALTH QUESTIONNAIRE - PHQ9: SUM OF ALL RESPONSES TO PHQ QUESTIONS 1-9: 0

## 2018-12-05 NOTE — MR AVS SNAPSHOT
After Visit Summary   12/5/2018    Marisa Randall    MRN: [id number]           Patient Information     Date Of Birth          1980        Visit Information        Provider Department      12/5/2018 4:00 PM Sandra Bliss DO [facility]        Today's Diagnoses     Pregnancy with history of pre-term labor, unspecified trimester    -  1      Care Instructions                If you have any questions regarding your visit, Please contact your care team.    Women s Health CLINIC HOURS TELEPHONE NUMBER   Sandra Bliss DO.    LUCIA Thompson -    DIANA Moore       Monday, Wednesday, [facility]  8:30a.m-5:00 p.m   [facility]  [address] [place] [address]  [phone] ask for [facility]    Imaging [phone]       Urgent Care locations:    [facility] Saturday and Sunday   9 am - 5 pm    Monday-Friday   12 pm - 8 pm  Saturday and Sunday   9 am - 5 pm   [phone]     [facility] [place] Labor and Delivery:  [phone]    If you need a medication refill, please contact your pharmacy. Please allow 3 business days for your refill to be completed.  As always, Thank you for trusting us with your healthcare needs!                  Follow-ups after your visit        Who to contact     If you have questions or need follow up information about today's clinic visit or your schedule please contact [facility] directly at [phone].  Normal or non-critical lab and imaging results will be communicated to you by MyChart, letter or phone within 4 business days after the clinic has received the results. If you do not hear from us within 7 days, please contact the clinic through MyChart or phone. If you have a critical or abnormal lab result, we will notify you by phone as soon as possible.  Submit refill requests through Z[employer] Displayshart or call your  "pharmacy and they will forward the refill request to us. Please allow 3 business days for your refill to be completed.          Additional Information About Your Visit        MyChart Information     [employer] gives you secure access to your electronic health record. If you see a primary care provider, you can also send messages to your care team and make appointments. If you have questions, please call your primary care clinic.  If you do not have a primary care provider, please call [phone] and they will assist you.        Care EveryWhere ID     This is your Care EveryWhere ID. This could be used by other organizations to access your [place] medical records  [id number]        Your Vitals Were     Pulse Temperature Height Last Period Pulse Oximetry BMI (Body Mass Index)    81 98.1  F (36.7  C) (Oral) 5' 6.53\" (1.69 m) 05/02/2013 96% 36.07 kg/m2       Blood Pressure from Last 3 Encounters:   12/05/18 105/71   09/23/18 96/66   06/18/18 117/83    Weight from Last 3 Encounters:   12/05/18 227 lb 1.6 oz (103 kg)   09/23/18 224 lb (101.6 kg)   06/13/18 225 lb (102.1 kg)              We Performed the Following     Beta HCG qual IFA urine        Primary Care Provider Office Phone # Fax #    Dagoberto Sykes [phone]       [facility] [address] [facility] [address]        Equal Access to Services     [facility]: Hadii aad ku hadasho Soomaali, waaxda luqadaha, qaybta kaalmada adeegyada, ema tony . So [facility] [phone].    ATENCIÓN: Si habla español, tiene a salinas disposición servicios gratuitos de asistencia lingüística. Llame al [phone].    We comply with applicable federal civil rights laws and [place] laws. We do not discriminate on the basis of race, color, national origin, age, disability, sex, sexual orientation, or gender identity.            Thank you!     Thank you for choosing [facility]  for your care. Our goal is always to " provide you with excellent care. Hearing back from our patients is one way we can continue to improve our services. Please take a few minutes to complete the written survey that you may receive in the mail after your visit with us. Thank you!             Your Updated Medication List - Protect others around you: Learn how to safely use, store and throw away your medicines at www.disposemymeds.org.          This list is accurate as of 12/5/18  4:15 PM.  Always use your most recent med list.                   Brand Name Dispense Instructions for use Diagnosis    gabapentin 300 MG capsule    NEURONTIN    90 capsule    Take 1 tablet (300 mg) every night for 1-3 days, then 1 tablet twice daily for 1-3 days, then 1 tablet three times daily    Lumbar radiculopathy, right       HYDROcodone-acetaminophen 5-325 MG tablet    NORCO    20 tablet    Take 1 tablet by mouth every 4 hours as needed for pain    Acute left-sided low back pain with left-sided sciatica       ketorolac 10 MG tablet    TORADOL    20 tablet    Take 1 tablet (10 mg) by mouth every 6 hours as needed    Lumbar radiculopathy, right       loratadine 10 MG tablet    CLARITIN     Take 10 mg by mouth        methocarbamol 500 MG tablet    ROBAXIN    30 tablet    Take 2 tablets (1,000 mg) by mouth 3 times daily as needed for muscle spasms    Back strain, subsequent encounter       MIRENA IU      by Intrauterine route.        predniSONE 20 MG tablet    DELTASONE    18 tablet    20mg/daily: 3 tablets the first 3 days, then 2 tabs the next 3 days, then 1 tab daily for the last 3 days    Bronchitis with bronchospasm, Cough       spacer/aero-hold chamber mask Rylie     1 each    1 Adult size spacer to use with MDI inhalers.    Bronchitis with bronchospasm

## 2018-12-05 NOTE — PROGRESS NOTES
Marisa is a 38 year old female, , who is here for her annual exam and is new to the [facility] gyn dept.  She had a Mirena IUD inserted on 13 for contraception and period management and understands that it  7 months ago.  She would like it removed today followed by insertion of a new Mirena for the same diagnoses.  She denies any issues with the Mirena and plans to have this renewed until menopause since she does not plan to have more children but declines sterilization.  She is not in a fasting state so orders for future labwork were placed.  Her dad was diagnosed with colon cancer at age 60 so she will check with his physician to determine when her baseline colonoscopy should be performed.  She declines STD screening.    ROS: Ten point review of systems was reviewed and negative except the above.    Health Maintenance   Topic Date Due     HIV SCREEN (SYSTEM ASSIGNED)  1998     PAP SCREENING Q3 YR (SYSTEM ASSIGNED)  2018     PHQ-2 Q1 YR  2019     TETANUS IMMUNIZATION (SYSTEM ASSIGNED)  2023     INFLUENZA VACCINE  Completed      Last pap: abnormal pap 15 years ago but normal since and no hx of any treatment, overdue for pap today per the patient  Last Mammogram: not applicable  Last Dexa: not applicable  Last Colonoscopy: not applicable  No results found for: CHOL  No results found for: HDL  No results found for: LDL  No results found for: TRIG  No results found for: CHOLHDLRATIO      OBHX:      PSH:   Past Surgical History:   Procedure Laterality Date     GALLBLADDER SURGERY       HERNIA REPAIR           PMH: Her past medical, surgical, and obstetric histories were reviewed and are documented in their appropriate chart areas.    ALL/Meds: Her medication and allergy histories were reviewed and are documented in their appropriate chart areas.    SH/FMH: Her social and family history was reviewed and documented in its appropriate chart area.    PE: /71  Pulse 81   "Temp 98.1  F (36.7  C) (Oral)  Ht 5' 6.53\" (1.69 m)  Wt 227 lb 1.6 oz (103 kg)  LMP 05/02/2013  SpO2 96%  BMI 36.07 kg/m2  Body mass index is 36.07 kg/(m^2).    General Appearance:  healthy, alert, active, no distress  Cardiovascular:  Regular rate and Rhythm without murmur  Neck: Supple, no adenopathy and thyroid normal  Lungs:  Clear, without wheeze, rale or rhonchi  Breast: normal breast exam  Abdomen: Benign, Soft, flat, non-tender, No masses, organomegaly, No inguinal nodes and Bowel sounds normoactive.   Pelvic:       - Ext: Vulva and perineum are normal without lesion, mass or discharge        - Urethra: normal without discharge        - Urethral Meatus: normal appearance       - Bladder: no tenderness, no masses       - Vagina: Normal mucosa, no discharge        - Cervix: normal and multiparous       - Uterus:Normal shape, position and consistency        - Adnexa: Normal without masses or tenderness       - Rectal: deferred    IUD removal note:  Informed consent was reviewed and obtained for both IUD removal followed by IUD insertion for contraception as well as period management.  Her UPT today was negative and was checked since she was 7 months late on replacement.  Using sterile technique, her cervix was cleansed with betadine swabs x 3 and a bi-valve speculum was placed.  The 2 IUD strings were grasped and her current Mirena IUD was removed without difficulty.  There were no complications and the Mirena appeared intact with both arms and body.  It was disposed of in proper fashion in a plastic specimen cup and follow up directions were provided to the patient.    IUD insertion note:  Next, the 12 o'clock position of her cervix was grasped with a single-toothed tenaculum and the new Mirena IUD was inserted without difficulty.  Her uterus sounded to 7.5 cm and was anteverted in position.  The 2 IUD strings were then trimmed to 3 cm each and all instruments were removed.  There were no complications " and counts were correct.  EBL was 1 ml and follow up directions and care were reviewed with the patient.  She was also provided written literature on this IUD.  The NDC # of her new Mirena IUD is 69860-973-17, expiration date is May 2021, and lot # is [id number].      A/P:  Well Woman Exam, IUD removal due to expiration, Insertion of new Mirena IUD for contraception and period management, Family hx of colon cancer (father)     -  I discussed the new pap recommendations regarding screening.  Explained the rationale for increased intervals between paps.  Questions asked and answered.  She does agree to this regiment.  Pap was obtained and submitted   -  BC: IUD (Mirena) which will be due for removal/replacement in 12/2023   -  Patient will check with father to determine when she is due for a baseline colonoscopy due to his hx of colon cancer  Orders Placed This Encounter   Procedures     INSERTION INTRAUTERINE DEVICE     REMOVE INTRAUTERINE DEVICE     Beta HCG qual IFA urine     Pap imaged thin layer screen with HPV - recommended age 30 - 65 years (select HPV order below)     HPV High Risk Types DNA Cervical     Glucose     Lipid Profile (Chol, Trig, HDL, LDL calc)     TSH with free T4 reflex      -  Encouraged self-breast exam   -  Encouraged low fat diet, regular exercise, and adequate calcium intake.    Sandra Acevedo, DO  FACOG, FACS

## 2018-12-05 NOTE — PATIENT INSTRUCTIONS
If you have any questions regarding your visit, Please contact your care team.    Women s Health CLINIC HOURS TELEPHONE NUMBER   Sandra Bliss DO.    LUCIA Thompson -    DIANA Moore       Monday, Wednesday, Thursday and Friday, [place]  8:30a.m-5:00 p.m   [facility]  [address] [place] [address]  [phone] ask for [facility]    Imaging [phone]       Urgent Care locations:    [facility] Saturday and Sunday   9 am - 5 pm    Monday-Friday   12 pm - 8 pm  Saturday and Sunday   9 am - 5 pm   [phone]     [facility] Labor and Delivery:  [phone]    If you need a medication refill, please contact your pharmacy. Please allow 3 business days for your refill to be completed.  As always, Thank you for trusting us with your healthcare needs!

## 2018-12-10 LAB
COPATH REPORT: NORMAL
PAP: NORMAL

## 2018-12-11 LAB
FINAL DIAGNOSIS: NORMAL
HPV HR 12 DNA CVX QL NAA+PROBE: NEGATIVE
HPV16 DNA SPEC QL NAA+PROBE: NEGATIVE
HPV18 DNA SPEC QL NAA+PROBE: NEGATIVE
SPECIMEN DESCRIPTION: NORMAL
SPECIMEN SOURCE CVX/VAG CYTO: NORMAL

## 2020-02-16 ENCOUNTER — HEALTH MAINTENANCE LETTER (OUTPATIENT)
Age: 40
End: 2020-02-16

## 2020-04-30 ENCOUNTER — MYC REFILL (OUTPATIENT)
Dept: ORTHOPEDICS | Facility: CLINIC | Age: 40
End: 2020-04-30

## 2020-04-30 DIAGNOSIS — M54.16 LUMBAR RADICULOPATHY, RIGHT: ICD-10-CM

## 2020-04-30 RX ORDER — GABAPENTIN 300 MG/1
CAPSULE ORAL
Qty: 90 CAPSULE | Refills: 0 | Status: CANCELLED | OUTPATIENT
Start: 2020-04-30

## 2020-06-10 ENCOUNTER — E-VISIT (OUTPATIENT)
Dept: FAMILY MEDICINE | Facility: CLINIC | Age: 40
End: 2020-06-10
Payer: COMMERCIAL

## 2020-06-10 DIAGNOSIS — B00.1 RECURRENT COLD SORES: Primary | ICD-10-CM

## 2020-06-10 PROCEDURE — 99421 OL DIG E/M SVC 5-10 MIN: CPT | Performed by: FAMILY MEDICINE

## 2020-06-10 RX ORDER — VALACYCLOVIR HYDROCHLORIDE 1 G/1
2000 TABLET, FILM COATED ORAL 2 TIMES DAILY
Qty: 4 TABLET | Refills: 0 | Status: SHIPPED | OUTPATIENT
Start: 2020-06-10 | End: 2020-06-10

## 2020-06-10 RX ORDER — VALACYCLOVIR HYDROCHLORIDE 1 G/1
2000 TABLET, FILM COATED ORAL 2 TIMES DAILY
Qty: 4 TABLET | Refills: 4 | Status: SHIPPED | OUTPATIENT
Start: 2020-06-10 | End: 2020-11-15

## 2020-10-22 ENCOUNTER — VIRTUAL VISIT (OUTPATIENT)
Dept: FAMILY MEDICINE | Facility: OTHER | Age: 40
End: 2020-10-22
Payer: COMMERCIAL

## 2020-10-22 ENCOUNTER — AMBULATORY - HEALTHEAST (OUTPATIENT)
Dept: INTERNAL MEDICINE | Facility: CLINIC | Age: 40
End: 2020-10-22

## 2020-10-22 DIAGNOSIS — Z20.822 SUSPECTED 2019 NOVEL CORONAVIRUS INFECTION: ICD-10-CM

## 2020-10-22 PROCEDURE — 99421 OL DIG E/M SVC 5-10 MIN: CPT | Performed by: PHYSICIAN ASSISTANT

## 2020-10-22 NOTE — PROGRESS NOTES
"Date: 10/22/2020 10:56:34  Clinician: Kane Shay  Clinician NPI: [id number]  Patient: Marisa Randall  Patient : 1980  Patient Address: [address] [place] [id number]  Patient Phone: [phone]  Visit Protocol: URI  Patient Summary:  Marisa is a 40 year old ( : 1980 ) female who initiated a [facility] Visit for COVID-19 (Coronavirus) evaluation and screening. When asked the question \"Please sign me up to receive news, health information and promotions from [facility].\", Marisa responded \"No\".    Marisa states her symptoms started suddenly 3-4 days ago.   Her symptoms consist of a headache, enlarged lymph nodes, a cough, nasal congestion, rhinitis, myalgia, chills, malaise, and a sore throat. She is experiencing mild difficulty breathing with activities but can speak normally in full sentences.   Symptom details     Nasal secretions: The color of her mucus is white and yellow.    Cough: Marisa coughs every 5-10 minutes and her cough is not more bothersome at night. Phlegm comes into her throat when she coughs. She believes her cough is caused by post-nasal drip. The color of the phlegm is white and yellow.     Sore throat: Marisa reports having moderate throat pain (4-6 on a 10 point pain scale), does not have exudate on her tonsils, and can swallow liquids. The lymph nodes in her neck are enlarged. A rash has not appeared on the skin since the sore throat started.     Headache: She states the headache is mild (1-3 on a 10 point pain scale).      Marisa denies having ear pain, wheezing, fever, anosmia, vomiting, nausea, facial pain or pressure, teeth pain, ageusia, and diarrhea. She also denies double sickening (worsening symptoms after initial improvement), taking antibiotic medication in the past month, and having recent facial or sinus surgery in the past 60 days.   Precipitating events  Within the past week, Marisa has not been exposed to someone with strep throat. She has not recently been exposed to " someone with influenza. Marisa has been in close contact with the following high risk individuals: people with asthma, heart disease or diabetes.   Pertinent COVID-19 (Coronavirus) information  In the past 14 days, Marisa has not worked in a congregate living setting.   She does not work or volunteer as healthcare worker or a  and does not work or volunteer in a healthcare facility.   Marisa also has not lived in a congregate living setting in the past 14 days. She does not live with a healthcare worker.   Marisa has not had a close contact with a laboratory-confirmed COVID-19 patient within 14 days of symptom onset.   Since December 2019, Marisa and has not had upper respiratory infection or influenza-like illness. Has not been diagnosed with lab-confirmed COVID-19 test   Pertinent medical history  Marisa had 1 sinus infection within the past year.   Marisa does not get yeast infections when she takes antibiotics.   Marisa needs a return to work/school note.   Weight: 225 lbs   Marisa does not smoke or use smokeless tobacco.   She denies pregnancy and denies breastfeeding. She does not menstruate.   Weight: 225 lbs    MEDICATIONS: No current medications, ALLERGIES: NKDA  Clinician Response:  Dear Marisa,   Your symptoms show that you may have coronavirus (COVID-19). This illness can cause fever, cough and trouble breathing. Many people get a mild case and get better on their own. Some people can get very sick.  What should I do?  We would like to test you for this virus.   1. Please call [phone] to schedule your visit. Explain that you were referred by [facility] to have a COVID-19 test. Be ready to share your [facility] visit ID number.  The following will serve as your written order for this COVID Test, ordered by me, for the indication of suspected COVID [Z20.828]: The test will be ordered in [employer], our electronic health record, after you are scheduled. It will show as ordered and authorized by Jose Werner MD.   "Order: COVID-19 (Coronavirus) PCR [facility]OMATIC [facility].      2. When it's time for your COVID test:  Stay at least 6 feet away from others. (If someone will drive you to your test, stay in the backseat, as far away from the  as you can.)   Cover your mouth and nose with a mask, tissue or washcloth.  Go straight to the testing site. Don't make any stops on the way there or back.      3.Starting now: Stay home and away from others (self-isolate) until:   You've had no fever---and no medicine that reduces fever---for one full day (24 hours). And...   Your other symptoms have gotten better. For example, your cough or breathing has improved. And...   At least 10 days have passed since your symptoms started.       During this time, don't leave the house except for testing or medical care.   Stay in your own room, even for meals. Use your own bathroom if you can.   Stay away from others in your home. No hugging, kissing or shaking hands. No visitors.  Don't go to work, school or anywhere else.    Clean \"high touch\" surfaces often (doorknobs, counters, handles, etc.). Use a household cleaning spray or wipes. You'll find a full list of  on the EPA website: www.epa.gov/pesticide-registration/list-n-disinfectants-use-against-sars-cov-2.   Cover your mouth and nose with a mask, tissue or washcloth to avoid spreading germs.  Wash your hands and face often. Use soap and water.  Caregivers in these groups are at risk for severe illness due to COVID-19:  o People 65 years and older  o People who live in a nursing home or long-term care facility  o People with chronic disease (lung, heart, cancer, diabetes, kidney, liver, immunologic)  o People who have a weakened immune system, including those who:   Are in cancer treatment  Take medicine that weakens the immune system, such as corticosteroids  Had a bone marrow or organ transplant  Have an immune deficiency  Have poorly controlled HIV or AIDS  Are " obese (body mass index of 40 or higher)  Smoke regularly   o Caregivers should wear gloves while washing dishes, handling laundry and cleaning bedrooms and bathrooms.  o Use caution when washing and drying laundry: Don't shake dirty laundry, and use the warmest water setting that you can.  o For more tips, go to www.cdc.gov/coronavirus/2019-ncov/downloads/10Things.pdf.       How can I take care of myself?   Get lots of rest. Drink extra fluids (unless a doctor has told you not to).   Take Tylenol (acetaminophen) for fever or pain. If you have liver or kidney problems, ask your family doctor if it's okay to take Tylenol.   Adults can take either:    650 mg (two 325 mg pills) every 4 to 6 hours, or...   1,000 mg (two 500 mg pills) every 8 hours as needed.    Note: Don't take more than 3,000 mg in one day. Acetaminophen is found in many medicines (both prescribed and over-the-counter medicines). Read all labels to be sure you don't take too much.   For children, check the Tylenol bottle for the right dose. The dose is based on the child's age or weight.    If you have other health problems (like cancer, heart failure, an organ transplant or severe kidney disease): Call your specialty clinic if you don't feel better in the next 2 days.       Know when to call 911. Emergency warning signs include:    Trouble breathing or shortness of breath Pain or pressure in the chest that doesn't go away Feeling confused like you haven't felt before, or not being able to wake up Bluish-colored lips or face.  Where can I get more information?    [facility] -- About COVID-19: [email or web address]   CDC -- What to Do If You're Sick: www.cdc.gov/coronavirus/2019-ncov/about/steps-when-sick.html   CDC -- Ending Home Isolation: www.cdc.gov/coronavirus/2019-ncov/hcp/disposition-in-home-patients.html   CDC -- Caring for Someone: www.cdc.gov/coronavirus/2019-ncov/if-you-are-sick/care-for-someone.html   [facility] -- Interim Guidance  for Hospital Discharge to Home: www.health.[facility].[place]/diseases/coronavirus/hcp/hospdischarge.pdf   [facility] clinical trials (COVID-19 research studies): [email or web address]-trials    Below are the COVID-19 hotlines at the [place] Department of Health ([facility]). Interpreters are available.    For health questions: Call 706-964-4891 or 1-642.870.1305 (7 a.m. to 7 p.m.) For questions about schools and childcare: Call 459-463-8590 or 1-496.721.5049 (7 a.m. to 7 p.m.)    Diagnosis: Contact with and (suspected) exposure to other viral communicable diseases  Diagnosis ICD: Z20.828

## 2020-10-25 ENCOUNTER — AMBULATORY - HEALTHEAST (OUTPATIENT)
Dept: FAMILY MEDICINE | Facility: CLINIC | Age: 40
End: 2020-10-25

## 2020-10-25 DIAGNOSIS — Z20.822 SUSPECTED 2019 NOVEL CORONAVIRUS INFECTION: ICD-10-CM

## 2020-10-27 ENCOUNTER — COMMUNICATION - HEALTHEAST (OUTPATIENT)
Dept: SCHEDULING | Facility: CLINIC | Age: 40
End: 2020-10-27

## 2020-11-15 ENCOUNTER — MYC REFILL (OUTPATIENT)
Dept: FAMILY MEDICINE | Facility: CLINIC | Age: 40
End: 2020-11-15

## 2020-11-15 DIAGNOSIS — B00.1 RECURRENT COLD SORES: ICD-10-CM

## 2020-11-16 RX ORDER — VALACYCLOVIR HYDROCHLORIDE 1 G/1
2000 TABLET, FILM COATED ORAL 2 TIMES DAILY
Qty: 4 TABLET | Refills: 4 | Status: SHIPPED | OUTPATIENT
Start: 2020-11-16 | End: 2022-11-29

## 2020-11-16 NOTE — TELEPHONE ENCOUNTER
Routing refill request to provider for review/approval because:  Labs not current:  Creatinine    Sandra Jones BSN, RN

## 2021-04-04 ENCOUNTER — HEALTH MAINTENANCE LETTER (OUTPATIENT)
Age: 41
End: 2021-04-04

## 2021-05-29 ENCOUNTER — RECORDS - HEALTHEAST (OUTPATIENT)
Dept: ADMINISTRATIVE | Facility: CLINIC | Age: 41
End: 2021-05-29

## 2021-09-18 ENCOUNTER — HEALTH MAINTENANCE LETTER (OUTPATIENT)
Age: 41
End: 2021-09-18

## 2022-04-30 ENCOUNTER — HEALTH MAINTENANCE LETTER (OUTPATIENT)
Age: 42
End: 2022-04-30

## 2022-11-22 NOTE — PROGRESS NOTES
SUBJECTIVE:   CC: Marisa is an 42 year old who presents for preventive health visit.       Patient has been advised of split billing requirements and indicates understanding: Yes     Pt is not fasting and did not have labs completed.    New patient to me.   bmi 41. Due for labs.       Mammogram? Never.     Gabapentin-prn back pain. Works really well. Would like refilled. Doesn't need much.   Robaxin if back pain is really bad.   Valtrex-prn cold sores.   Albuterol only when has a cold, uses prn.     Not working on weight loss. Interested in medical weight management. Discussed. See below.           Healthy Habits:     Getting at least 3 servings of Calcium per day:  Yes    Bi-annual eye exam:  Yes    Dental care twice a year:  Yes    Sleep apnea or symptoms of sleep apnea:  Excessive snoring    Diet:  Regular (no restrictions)    Frequency of exercise:  None    Taking medications regularly:  Yes    Medication side effects:  Not applicable    PHQ-2 Total Score: 0    Additional concerns today:  No      Today's PHQ-2 Score:   PHQ-2 ( 1999 Pfizer) 11/29/2022   Q1: Little interest or pleasure in doing things 0   Q2: Feeling down, depressed or hopeless 0   PHQ-2 Score 0   Q1: Little interest or pleasure in doing things Not at all   Q2: Feeling down, depressed or hopeless Not at all   PHQ-2 Score 0       Have you ever done Advance Care Planning? (For example, a Health Directive, POLST, or a discussion with a medical provider or your loved ones about your wishes): No, advance care planning information given to patient to review.  Patient declined advance care planning discussion at this time.    Social History     Tobacco Use     Smoking status: Former     Smokeless tobacco: Never   Substance Use Topics     Alcohol use: Yes     Comment: rare     If you drink alcohol do you typically have >3 drinks per day or >7 drinks per week? No    Alcohol Use 11/29/2022   Prescreen: >3 drinks/day or >7 drinks/week? No   Prescreen: >3  drinks/day or >7 drinks/week? -   No flowsheet data found.    Reviewed orders with patient.  Reviewed health maintenance and updated orders accordingly - Yes  Lab work is in process  Labs reviewed in EPIC  BP Readings from Last 3 Encounters:   11/29/22 114/86   12/05/18 105/71   09/23/18 96/66    Wt Readings from Last 3 Encounters:   11/29/22 115.2 kg (254 lb)   12/05/18 103 kg (227 lb 1.6 oz)   09/23/18 101.6 kg (224 lb)                  Patient Active Problem List   Diagnosis     Carpal tunnel syndrome     Radial styloid tenosynovitis     Synovitis and tenosynovitis     Wrist tendonitis     Herniation of intervertebral disc between L4 and L5     Morbid obesity (H)     Past Surgical History:   Procedure Laterality Date     CHOLECYSTECTOMY       GALLBLADDER SURGERY  2010     HERNIA REPAIR  2013       Social History     Tobacco Use     Smoking status: Former     Smokeless tobacco: Never   Substance Use Topics     Alcohol use: Yes     Comment: rare     Family History   Problem Relation Age of Onset     Diabetes Mother         Diagnosed 5/2018     Depression Mother      Colon Cancer Father      Hyperlipidemia Father      Depression Father          Current Outpatient Medications   Medication Sig Dispense Refill     albuterol (PROAIR HFA/PROVENTIL HFA/VENTOLIN HFA) 108 (90 Base) MCG/ACT inhaler Inhale 2 puffs into the lungs every 6 hours as needed for shortness of breath / dyspnea or wheezing 18 g 11     gabapentin (NEURONTIN) 300 MG capsule One capsule daily as needed for back pain. 30 capsule 11     Levonorgestrel (MIRENA IU) by Intrauterine route.       loratadine (CLARITIN) 10 MG tablet Take 10 mg by mouth       methocarbamol (ROBAXIN) 500 MG tablet Take 2 tablets (1,000 mg) by mouth 3 times daily as needed for muscle spasms 30 tablet 1     valACYclovir (VALTREX) 1000 mg tablet Take 2 tablets (2,000 mg) by mouth 2 times daily 4 tablet 4     Allergies   Allergen Reactions     Cats      Dogs        Breast Cancer  Screening:  Any new diagnosis of family breast, ovarian, or bowel cancer? No    FHS-7:   Breast CA Risk Assessment (FHS-7) 2022   Did any of your first-degree relatives have breast or ovarian cancer? No   Did any of your relatives have bilateral breast cancer? No   Did any man in your family have breast cancer? No   Did any woman in your family have breast and ovarian cancer? No   Did any woman in your family have breast cancer before age 50 y? No   Do you have 2 or more relatives with breast and/or ovarian cancer? No   Do you have 2 or more relatives with breast and/or bowel cancer? No         Pertinent mammograms are reviewed under the imaging tab.    History of abnormal Pap smear: NO - age 30-65 PAP every 5 years with negative HPV co-testing recommended  PAP / HPV Latest Ref Rng & Units 2018   PAP (Historical) - NIL   HPV16 NEG:Negative Negative   HPV18 NEG:Negative Negative   HRHPV NEG:Negative Negative     Reviewed and updated as needed this visit by clinical staff    Allergies  Meds              Reviewed and updated as needed this visit by Provider                 Past Medical History:   Diagnosis Date     Uncomplicated asthma     Only when I have a cold      Past Surgical History:   Procedure Laterality Date     CHOLECYSTECTOMY       GALLBLADDER SURGERY       HERNIA REPAIR       OB History    Para Term  AB Living   1 1 1 0 0 2   SAB IAB Ectopic Multiple Live Births   0 0 0 0 1      # Outcome Date GA Lbr Nestor/2nd Weight Sex Delivery Anes PTL Lv   1 Term 06    F    ANALY       Review of Systems   Constitutional: Negative for chills and fever.   HENT: Negative for congestion, ear pain, hearing loss and sore throat.    Eyes: Negative for pain and visual disturbance.   Respiratory: Negative for cough and shortness of breath.    Cardiovascular: Negative for chest pain, palpitations and peripheral edema.   Gastrointestinal: Negative for abdominal pain, constipation,  "diarrhea, heartburn, hematochezia and nausea.   Breasts:  Positive for tenderness. Negative for breast mass and discharge.   Genitourinary: Negative for dysuria, frequency, genital sores, hematuria, pelvic pain, urgency, vaginal bleeding and vaginal discharge.   Musculoskeletal: Negative for arthralgias, joint swelling and myalgias.   Skin: Negative for rash.   Neurological: Negative for dizziness, weakness, headaches and paresthesias.   Psychiatric/Behavioral: Negative for mood changes. The patient is not nervous/anxious.         OBJECTIVE:   /86   Pulse 109   Temp 98  F (36.7  C) (Tympanic)   Resp 16   Ht 1.676 m (5' 6\")   Wt 115.2 kg (254 lb)   LMP  (LMP Unknown)   SpO2 100%   Breastfeeding No   BMI 41.00 kg/m    Physical Exam  GENERAL: alert, no distress and obese  EYES: Eyes grossly normal to inspection, PERRL and conjunctivae and sclerae normal  HENT: ear canals and TM's normal, nose and mouth without ulcers or lesions  NECK: no adenopathy, no asymmetry, masses, or scars and thyroid normal to palpation  RESP: lungs clear to auscultation - no rales, rhonchi or wheezes  BREAST: normal without masses, tenderness or nipple discharge and no palpable axillary masses or adenopathy  CV: regular rate and rhythm, normal S1 S2, no S3 or S4, no murmur, click or rub, no peripheral edema and peripheral pulses strong  ABDOMEN: soft, nontender, no hepatosplenomegaly, no masses and bowel sounds normal  MS: no gross musculoskeletal defects noted, no edema  SKIN: no suspicious lesions or rashes. Does have a lot of nevi on back  NEURO: Normal strength and tone, mentation intact and speech normal  PSYCH: mentation appears normal, affect normal/bright        ASSESSMENT/PLAN:   (Z00.00) Routine general medical examination at a health care facility  (primary encounter diagnosis)  Comment:   Plan:     (E66.01) Morbid obesity (H)  Comment: needs improvement  Plan: Comprehensive Weight Management            (M54.16) " Lumbar radiculopathy, right  Comment:  stable  Plan: gabapentin (NEURONTIN) 300 MG capsule            (L98.9) Skin lesion  Comment:   Plan: Adult Dermatology Referral            (B00.1) Recurrent cold sores  Comment: stable  Plan: valACYclovir (VALTREX) 1000 mg tablet            (R06.2) Wheezing  Comment: stable  Plan: albuterol (PROAIR HFA/PROVENTIL HFA/VENTOLIN         HFA) 108 (90 Base) MCG/ACT inhaler            (Z13.1) Screening for diabetes mellitus  Comment:   Plan: Hemoglobin A1c, Basic metabolic panel  (Ca, Cl,        CO2, Creat, Gluc, K, Na, BUN)            (Z13.220) Screening, lipid  Comment:   Plan: Lipid panel reflex to direct LDL Fasting            (Z12.31) Encounter for screening mammogram for breast cancer  Comment:   Plan: *MA Screening Digital Bilateral              Patient has been advised of split billing requirements and indicates understanding: Yes      COUNSELING:  Reviewed preventive health counseling, as reflected in patient instructions       Regular exercise       Healthy diet/nutrition        She reports that she has quit smoking. She has never used smokeless tobacco.      =Zeina Yañez PA-C  [facility]

## 2022-11-22 NOTE — PATIENT INSTRUCTIONS
Check with insurance regarding weight loss resources including medication or nutrition counseling or programs    Lifestyle recommendations:  Being overweight or obese puts you are risk of major health problems including but not limited to: heart disease/heart attack, stroke, high cholesterol, high blood pressure, and diabetes.  This is why it is important to be at a healthy weight for your height.     Exercise 30 minutes 3-5 times a week, if you can only do 10 minutes 3 times a week that is still shown to have great benefit!  Brisk walking even counts for this.  Consider free youtube videos for exercise that fits your needs and lifestyle.     Monitor your caffeine and soda intake, try to minimize these beverages    Drink plenty of water (about 70-80 ounces a day)    Try to eat a vegetable and fruit  with lunch and dinner.  Have a breakfast that contains protein such as eggs or oatmeal.  Decrease your white bread, pasta, and sweets intake.  Increase lean proteins like chicken or pork. Try to eat out 1-2 times a week or less.  Monitor your portion sizes, try using smaller plates if needed.  Eat slowly, this gives you time to be aware that your body is full.   Let me know at any time if you would like a referral to a nutritionist!      Preventive Health Recommendations  Female Ages 40 to 49    Yearly exam:   See your health care provider every year in order to  Review health changes.   Discuss preventive care.    Review your medicines if your doctor prescribed any.    Get a Pap test every three years (unless you have an abnormal result and your provider advises testing more often).    If you get Pap tests with HPV test, you only need to test every 5 years, unless you have an abnormal result. You do not need a Pap test if your uterus was removed (hysterectomy) and you have not had cancer.    You should be tested each year for STDs (sexually transmitted diseases), if you're at risk.   Ask your doctor if you should have a  mammogram.    Have a colonoscopy (test for colon cancer) if someone in your family has had colon cancer or polyps before age 50.     Have a cholesterol test every 5 years.     Have a diabetes test (fasting glucose) after age 45. If you are at risk for diabetes, you should have this test every 3 years.    Shots: Get a flu shot each year. Get a tetanus shot every 10 years.     Nutrition:   Eat at least 5 servings of fruits and vegetables each day.  Eat whole-grain bread, whole-wheat pasta and brown rice instead of white grains and rice.  Get adequate Calcium and Vitamin D.      Lifestyle  Exercise at least 150 minutes a week (an average of 30 minutes a day, 5 days a week). This will help you control your weight and prevent disease.  Limit alcohol to one drink per day.  No smoking.   Wear sunscreen to prevent skin cancer.  See your dentist every six months for an exam and cleaning.

## 2022-11-29 ENCOUNTER — OFFICE VISIT (OUTPATIENT)
Dept: FAMILY MEDICINE | Facility: CLINIC | Age: 42
End: 2022-11-29
Payer: COMMERCIAL

## 2022-11-29 VITALS
DIASTOLIC BLOOD PRESSURE: 86 MMHG | SYSTOLIC BLOOD PRESSURE: 114 MMHG | RESPIRATION RATE: 16 BRPM | OXYGEN SATURATION: 100 % | WEIGHT: 254 LBS | HEIGHT: 66 IN | BODY MASS INDEX: 40.82 KG/M2 | TEMPERATURE: 98 F | HEART RATE: 109 BPM

## 2022-11-29 DIAGNOSIS — Z13.220 SCREENING, LIPID: ICD-10-CM

## 2022-11-29 DIAGNOSIS — L98.9 SKIN LESION: ICD-10-CM

## 2022-11-29 DIAGNOSIS — Z00.00 ROUTINE GENERAL MEDICAL EXAMINATION AT A HEALTH CARE FACILITY: Primary | ICD-10-CM

## 2022-11-29 DIAGNOSIS — E66.01 MORBID OBESITY (H): ICD-10-CM

## 2022-11-29 DIAGNOSIS — B00.1 RECURRENT COLD SORES: ICD-10-CM

## 2022-11-29 DIAGNOSIS — R06.2 WHEEZING: ICD-10-CM

## 2022-11-29 DIAGNOSIS — Z13.1 SCREENING FOR DIABETES MELLITUS: ICD-10-CM

## 2022-11-29 DIAGNOSIS — M54.16 LUMBAR RADICULOPATHY, RIGHT: ICD-10-CM

## 2022-11-29 DIAGNOSIS — Z12.31 ENCOUNTER FOR SCREENING MAMMOGRAM FOR BREAST CANCER: ICD-10-CM

## 2022-11-29 PROCEDURE — 99214 OFFICE O/P EST MOD 30 MIN: CPT | Mod: 25 | Performed by: PHYSICIAN ASSISTANT

## 2022-11-29 PROCEDURE — 99386 PREV VISIT NEW AGE 40-64: CPT | Performed by: PHYSICIAN ASSISTANT

## 2022-11-29 RX ORDER — GABAPENTIN 300 MG/1
CAPSULE ORAL
Qty: 30 CAPSULE | Refills: 11 | Status: SHIPPED | OUTPATIENT
Start: 2022-11-29

## 2022-11-29 RX ORDER — VALACYCLOVIR HYDROCHLORIDE 1 G/1
2000 TABLET, FILM COATED ORAL 2 TIMES DAILY
Qty: 4 TABLET | Refills: 4 | Status: SHIPPED | OUTPATIENT
Start: 2022-11-29

## 2022-11-29 RX ORDER — ALBUTEROL SULFATE 90 UG/1
2 AEROSOL, METERED RESPIRATORY (INHALATION) EVERY 6 HOURS PRN
Qty: 18 G | Refills: 11 | Status: SHIPPED | OUTPATIENT
Start: 2022-11-29

## 2022-11-29 ASSESSMENT — ENCOUNTER SYMPTOMS
CONSTIPATION: 0
NERVOUS/ANXIOUS: 0
HEMATOCHEZIA: 0
DIARRHEA: 0
ARTHRALGIAS: 0
COUGH: 0
DYSURIA: 0
NAUSEA: 0
HEADACHES: 0
MYALGIAS: 0
FREQUENCY: 0
BREAST MASS: 0
PALPITATIONS: 0
WEAKNESS: 0
EYE PAIN: 0
FEVER: 0
HEMATURIA: 0
SHORTNESS OF BREATH: 0
JOINT SWELLING: 0
DIZZINESS: 0
HEARTBURN: 0
ABDOMINAL PAIN: 0
PARESTHESIAS: 0
SORE THROAT: 0
CHILLS: 0

## 2022-11-29 ASSESSMENT — PAIN SCALES - GENERAL: PAINLEVEL: NO PAIN (0)

## 2023-06-02 ENCOUNTER — HEALTH MAINTENANCE LETTER (OUTPATIENT)
Age: 43
End: 2023-06-02

## 2023-06-27 ENCOUNTER — ANCILLARY PROCEDURE (OUTPATIENT)
Dept: MAMMOGRAPHY | Facility: CLINIC | Age: 43
End: 2023-06-27
Attending: PHYSICIAN ASSISTANT
Payer: COMMERCIAL

## 2023-06-27 DIAGNOSIS — Z12.31 ENCOUNTER FOR SCREENING MAMMOGRAM FOR BREAST CANCER: ICD-10-CM

## 2023-06-27 PROCEDURE — 77067 SCR MAMMO BI INCL CAD: CPT | Performed by: RADIOLOGY

## 2023-06-27 PROCEDURE — 77063 BREAST TOMOSYNTHESIS BI: CPT | Performed by: RADIOLOGY

## 2023-07-13 ENCOUNTER — VIRTUAL VISIT (OUTPATIENT)
Dept: FAMILY MEDICINE | Facility: CLINIC | Age: 43
End: 2023-07-13
Payer: COMMERCIAL

## 2023-07-13 DIAGNOSIS — B00.1 RECURRENT HERPES LABIALIS: Primary | ICD-10-CM

## 2023-07-13 DIAGNOSIS — L01.00 IMPETIGO: ICD-10-CM

## 2023-07-13 PROCEDURE — 99213 OFFICE O/P EST LOW 20 MIN: CPT | Mod: VID | Performed by: FAMILY MEDICINE

## 2023-07-13 RX ORDER — ACYCLOVIR 400 MG/1
400 TABLET ORAL EVERY 12 HOURS
Qty: 60 TABLET | Refills: 0 | Status: SHIPPED | OUTPATIENT
Start: 2023-07-27 | End: 2023-08-22

## 2023-07-13 RX ORDER — MUPIROCIN 20 MG/G
OINTMENT TOPICAL 3 TIMES DAILY
Qty: 30 G | Refills: 0 | Status: SHIPPED | OUTPATIENT
Start: 2023-07-13

## 2023-07-13 RX ORDER — ACYCLOVIR 400 MG/1
400 TABLET ORAL EVERY 8 HOURS
Qty: 30 TABLET | Refills: 0 | Status: SHIPPED | OUTPATIENT
Start: 2023-07-13 | End: 2023-07-23

## 2023-08-20 DIAGNOSIS — B00.1 RECURRENT HERPES LABIALIS: ICD-10-CM

## 2023-08-22 RX ORDER — ACYCLOVIR 400 MG/1
400 TABLET ORAL EVERY 12 HOURS
Qty: 60 TABLET | Refills: 3 | Status: SHIPPED | OUTPATIENT
Start: 2023-08-22 | End: 2023-11-24

## 2023-11-23 DIAGNOSIS — B00.1 RECURRENT HERPES LABIALIS: ICD-10-CM

## 2023-11-24 RX ORDER — ACYCLOVIR 400 MG/1
400 TABLET ORAL EVERY 12 HOURS
Qty: 180 TABLET | Refills: 0 | Status: SHIPPED | OUTPATIENT
Start: 2023-11-24 | End: 2024-02-22

## 2024-04-04 ENCOUNTER — VIRTUAL VISIT (OUTPATIENT)
Dept: FAMILY MEDICINE | Facility: CLINIC | Age: 44
End: 2024-04-04
Payer: COMMERCIAL

## 2024-04-04 DIAGNOSIS — J01.90 ACUTE SINUSITIS WITH SYMPTOMS > 10 DAYS: Primary | ICD-10-CM

## 2024-04-04 PROCEDURE — 99213 OFFICE O/P EST LOW 20 MIN: CPT | Mod: 95 | Performed by: PHYSICIAN ASSISTANT

## 2024-04-04 NOTE — PROGRESS NOTES
"    Instructions Relayed to Patient by Virtual Roomer:     Patient is active on [employer]:   Relayed following to patient: \"It looks like you are active on [employer], are you able to join the visit this way? If not, do you need us to send you a link now or would you like your provider to send a link via text or email when they are ready to initiate the visit?\"    Reminded patient to ensure they were logged on to virtual visit by arrival time listed. Documented in appointment notes if patient had flexibility to initiate visit sooner than arrival time. If pediatric virtual visit, ensured pediatric patient along with parent/guardian will be present for video visit.     Patient offered the website [email or web address] and/or phone number to [employer] Help line: [phone]    Marisa is a 44 year old who is being evaluated via a billable video visit.    How would you like to obtain your AVS? Keecker  If the video visit is dropped, the invitation should be resent by: Text to cell phone: [phone]  Will anyone else be joining your video visit? No      Assessment & Plan     Acute sinusitis with symptoms > 10 days  Recurrent - approximately annually.  Will treat with augmentin. Doesn't typically get yeast vaginitis with antibiotics   Return urgently if any change in symptoms.    Follow up with primary care if no improvement over the next 10 days  - amoxicillin-clavulanate (AUGMENTIN) 875-125 MG tablet  Dispense: 20 tablet; Refill: 0      Prescription drug management          Patient Instructions   Take course of augmentin  Follow up with primary care if no improvement in symptoms over the next 10 days  Return urgently if any change in symptoms like increasing cough, fever, shortness of breath or other change in symptoms.     Subjective   Marisa is a 44 year old, presenting for the following health issues:  Sinus Problem      4/4/2024     3:34 PM   Additional Questions   Roomed by Ernestina shore   Accompanied by Self "     Sinus Problem     History of Present Illness       Reason for visit:  Sinus infection  Symptom onset:  1-2 weeks ago  Symptoms include:  Sinus pressure and extreme congestion  Symptom intensity:  Severe  Symptom progression:  Staying the same  Had these symptoms before:  Yes  Has tried/received treatment for these symptoms:  Yes  Previous treatment was successful:  No  What makes it worse:  Laying down  What makes it better:  Mucinex D works for like 4 hours.    She eats 2-3 servings of fruits and vegetables daily.She consumes 1 sweetened beverage(s) daily.She exercises with enough effort to increase her heart rate 9 or less minutes per day.  She exercises with enough effort to increase her heart rate 3 or less days per week.   She is taking medications regularly.           Patient unfamiliar to me with history of asthma presents for treatment of a sinus infection. Gets a sinus infection every year.  Tried allergy pills without relief.    Not a stuffy nose.  All in sinuses and postnasal drainage and choking on it and nauseated some days.   No drainage from nose.   Light fever barely 100 on Tuesday but didn't last more than a day  Coughing not productive.   Using albuterol more. Gets some relief from mucinex D.  Can't tolerate saline rinses and flonase never helpful in past       Review of Systems  Constitutional, HEENT, cardiovascular, pulmonary, gi and gu systems are negative, except as otherwise noted.      Objective           Vitals:  No vitals were obtained today due to virtual visit.    Physical Exam   GENERAL: alert and no distress  EYES: Eyes grossly normal to inspection.  No discharge or erythema, or obvious scleral/conjunctival abnormalities.  RESP: No audible wheeze, has a dry cough, no visible cyanosis or retractions .    SKIN: Visible skin clear. No significant rash, abnormal pigmentation or lesions.  NEURO: Cranial nerves grossly intact.  Mentation and speech appropriate for age.  PSYCH: Appropriate  affect, tone, and pace of words          Video-Visit Details    Type of service:  Video Visit   Originating Location (pt. Location): Home    Distant Location (provider location):  Off-site  Platform used for Video Visit: Johnie  Signed Electronically by: Molly Hagen PA-C

## 2024-04-04 NOTE — PATIENT INSTRUCTIONS
Take course of augmentin  Follow up with primary care if no improvement in symptoms over the next 10 days  Return urgently if any change in symptoms like increasing cough, fever, shortness of breath or other change in symptoms.

## 2024-04-29 ENCOUNTER — TELEPHONE (OUTPATIENT)
Dept: FAMILY MEDICINE | Facility: CLINIC | Age: 44
End: 2024-04-29

## 2024-04-29 NOTE — TELEPHONE ENCOUNTER
Patient Quality Outreach    Patient is due for the following:   Cervical Cancer Screening - PAP Needed  Physical Preventive Adult Physical      Topic Date Due    Diptheria Tetanus Pertussis (DTAP/TDAP/TD) Vaccine (7 - Td or Tdap) 01/22/2023    Flu Vaccine (1) 09/01/2023    COVID-19 Vaccine (5 - 2023-24 season) 09/01/2023         Type of outreach:    Chart review performed, no outreach needed.    Patient has appt 5/13/24 with OB for a pap.  Questions for provider review:    None           Jade Wren MA  Chart routed to Care Team.

## 2024-06-22 ENCOUNTER — HEALTH MAINTENANCE LETTER (OUTPATIENT)
Age: 44
End: 2024-06-22

## 2024-07-12 NOTE — TELEPHONE ENCOUNTER
Patient Quality Outreach      Patient is due for the following:   Cervical Cancer Screening - PAP Needed  Physical Preventive Adult Physical           Topic Date Due    Diptheria Tetanus Pertussis (DTAP/TDAP/TD) Vaccine (7 - Td or Tdap) 01/22/2023    Flu Vaccine (1) 09/01/2023    COVID-19 Vaccine (5 - 2023-24 season) 09/01/2023         Type of outreach:    Sent GetApp message.      Questions for provider review:    None           Jade Wren MA  Chart routed to Care Team.

## 2024-09-20 NOTE — TELEPHONE ENCOUNTER
Patient Quality Outreach    Patient is due for the following:   Cervical Cancer Screening - PAP Needed  Physical Preventive Adult Physical              Topic Date Due    Diptheria Tetanus Pertussis (DTAP/TDAP/TD) Vaccine (7 - Td or Tdap) 01/22/2023    Flu Vaccine (1) 09/01/2023    COVID-19 Vaccine (5 - 2023-24 season) 09/01/2023       Type of outreach:    Sent Information Gateway message.      Questions for provider review:    None           Jade Wren MA

## 2024-11-06 ENCOUNTER — VIRTUAL VISIT (OUTPATIENT)
Dept: FAMILY MEDICINE | Facility: CLINIC | Age: 44
End: 2024-11-06
Payer: COMMERCIAL

## 2024-11-06 DIAGNOSIS — J01.90 ACUTE SINUSITIS WITH SYMPTOMS > 10 DAYS: Primary | ICD-10-CM

## 2024-11-06 PROCEDURE — 99213 OFFICE O/P EST LOW 20 MIN: CPT | Mod: 95 | Performed by: NURSE PRACTITIONER

## 2024-11-06 RX ORDER — OXYMETAZOLINE HYDROCHLORIDE 0.05 G/100ML
2 SPRAY NASAL 2 TIMES DAILY
COMMUNITY
Start: 2024-11-06 | End: 2024-11-09

## 2024-11-06 NOTE — PROGRESS NOTES
Marisa is a 44 year old who is being evaluated via a billable video visit.    How would you like to obtain your AVS? MyChart  If the video visit is dropped, the invitation should be resent by: Text to cell phone: [phone]  Will anyone else be joining your video visit? No      Assessment & Plan     Acute sinusitis with symptoms > 10 days  -Increase fluid intake, consider using steam or warm washcloth over the face to keep mucus thin and slippery. Avoid smoke and secondhand smoke. Nasal saline or sinus rinses are also helpful in reducing sinusitis from allergies, viral, or bacterial infections. Tylenol or ibuprofen can be used for pain or discomfort.   -Go to emergency room if you get a severe headache or neck pain/stiffness accompanied by a high fever or changes in vision.    - amoxicillin-clavulanate (AUGMENTIN) 875-125 MG tablet; Take 1 tablet by mouth 2 times daily for 7 days.    - oxymetazoline (AFRIN) 0.05 % nasal spray; Spray 0.2 mLs (2 sprays) into both nostrils 2 times daily for 3 days.            Subjective   Marisa is a 44 year old, presenting for the following health issues:  URI    Started feeling sick 2 weeks ago. Tried mucinex, dayquil, advil, sudafed. Face, behind nose and throat lots of pressure, congestion. No history sinus surgery.  No pain/pressure in teeth.     No antibiotic use in past month.     History of Present Illness       Reason for visit:  Sinus imfection  Symptom onset:  1-2 weeks ago  Symptoms include:  Sinus congestion  Symptom intensity:  Severe  Symptom progression:  Staying the same  Had these symptoms before:  Yes  Has tried/received treatment for these symptoms:  Yes  Previous treatment was successful:  Yes  Prior treatment description:  Antibiotics  What makes it worse:  Laying down .talking .  What makes it better:  No   She is taking medications regularly.             Objective           Vitals:  No vitals were obtained today due to virtual visit.    Physical Exam   GENERAL:  alert and no distress  EYES: Eyes grossly normal to inspection.  No discharge or erythema, or obvious scleral/conjunctival abnormalities.  RESP: No audible wheeze, cough, or visible cyanosis.    SKIN: Visible skin clear. No significant rash, abnormal pigmentation or lesions.  NEURO: Cranial nerves grossly intact.  Mentation and speech appropriate for age.  PSYCH: Appropriate affect, tone, and pace of words          Video-Visit Details    Type of service:  Video Visit   Originating Location (pt. Location): Home    Distant Location (provider location):  On-site  Platform used for Video Visit: Johnie  Signed Electronically by: RAMIN Rae CNP

## 2024-11-06 NOTE — PATIENT INSTRUCTIONS
Acute Sinusitis: Care Instructions  Overview     Acute sinusitis is an inflammation of the mucous membranes inside the nose and sinuses. Sinuses are the hollow spaces in your skull around the eyes and nose. Acute sinusitis often follows a cold. Acute sinusitis causes thick, discolored mucus that drains from the nose or down the back of the throat. It also can cause pain and pressure in your head and face along with a stuffy or blocked nose.  In most cases, sinusitis gets better on its own in 1 to 2 weeks. But some mild symptoms may last for several weeks. Sometimes antibiotics are needed if there is a bacterial infection.  Follow-up care is a key part of your treatment and safety. Be sure to make and go to all appointments, and call your doctor if you are having problems. It's also a good idea to know your test results and keep a list of the medicines you take.  How can you care for yourself at home?  Use saline (saltwater) nasal washes. This can help keep your nasal passages open and wash out mucus and allergens.  You can buy saline nose washes at a grocery store or drugstore. Follow the instructions on the package.  You can make your own at home. Add 1 teaspoon of non-iodized salt and 1 teaspoon of baking soda to 2 cups of distilled or boiled and cooled water. Fill a squeeze bottle or a nasal cleansing pot (such as a neti pot) with the nasal wash. Then put the tip into your nostril, and lean over the sink. With your mouth open, gently squirt the liquid. Repeat on the other side.  Try a decongestant nasal spray like oxymetazoline (Afrin). Do not use it for more than 3 days in a row. Using it for more than 3 days can make your congestion worse.  If needed, take an over-the-counter pain medicine, such as acetaminophen (Tylenol), ibuprofen (Advil, Motrin), or naproxen (Aleve). Read and follow all instructions on the label.  If the doctor prescribed antibiotics, take them as directed. Do not stop taking them just  "because you feel better. You need to take the full course of antibiotics.  Be careful when taking over-the-counter cold or flu medicines and Tylenol at the same time. Many of these medicines have acetaminophen, which is Tylenol. Read the labels to make sure that you are not taking more than the recommended dose. Too much acetaminophen (Tylenol) can be harmful.  Try a steroid nasal spray. It may help with your symptoms.  Breathe warm, moist air. You can use a steamy shower, a hot bath, or a sink filled with hot water. Avoid cold, dry air. Using a humidifier in your home may help. Follow the directions for cleaning the machine.  When should you call for help?   Call your doctor now or seek immediate medical care if:    You have new or worse swelling, redness, or pain in your face or around one or both of your eyes.     You have double vision or a change in your vision.     You have a high fever.     You have a severe headache and a stiff neck.     You have mental changes, such as feeling confused or much less alert.   Watch closely for changes in your health, and be sure to contact your doctor if:    You are not getting better as expected.   Where can you learn more?  Go to https://www.[employer].net/patiented  Enter I933 in the search box to learn more about \"Acute Sinusitis: Care Instructions.\"  Current as of: September 27, 2023  Content Version: 14.2 2024 [facility].   Care instructions adapted under license by your healthcare professional. If you have questions about a medical condition or this instruction, always ask your healthcare professional. [employer] disclaims any warranty or liability for your use of this information.    You may want to try warm salt water gargles or rinses to feel better or help prevent another bout in the future. Mix 1 teaspoon of salt in 8 ounces of water, gargle, and spit. Do this several times a day for several days. Do not swallow the mixture.    You may " want to try a nasal lavage (also known as nasal irrigation). You can find over-the-counter products, such as Neti-Pot, at retail locations or make your own at home. Instructions for homemade nasal lavage and more information on the process are available online at http://www.aafp.org/[email or web address]/[email or web address].

## 2025-05-28 ENCOUNTER — PATIENT OUTREACH (OUTPATIENT)
Dept: CARE COORDINATION | Facility: CLINIC | Age: 45
End: 2025-05-28
Payer: COMMERCIAL

## 2025-05-30 ENCOUNTER — ANCILLARY PROCEDURE (OUTPATIENT)
Dept: MAMMOGRAPHY | Facility: CLINIC | Age: 45
End: 2025-05-30
Payer: COMMERCIAL

## 2025-05-30 DIAGNOSIS — Z12.31 VISIT FOR SCREENING MAMMOGRAM: ICD-10-CM

## 2025-05-30 PROCEDURE — 77067 SCR MAMMO BI INCL CAD: CPT | Mod: GC

## 2025-05-30 PROCEDURE — 77063 BREAST TOMOSYNTHESIS BI: CPT | Mod: GC

## 2025-06-16 SDOH — HEALTH STABILITY: PHYSICAL HEALTH: ON AVERAGE, HOW MANY DAYS PER WEEK DO YOU ENGAGE IN MODERATE TO STRENUOUS EXERCISE (LIKE A BRISK WALK)?: 2 DAYS

## 2025-06-16 SDOH — HEALTH STABILITY: PHYSICAL HEALTH: ON AVERAGE, HOW MANY MINUTES DO YOU ENGAGE IN EXERCISE AT THIS LEVEL?: 10 MIN

## 2025-06-16 ASSESSMENT — SOCIAL DETERMINANTS OF HEALTH (SDOH): HOW OFTEN DO YOU GET TOGETHER WITH FRIENDS OR RELATIVES?: TWICE A WEEK

## 2025-06-17 ENCOUNTER — OFFICE VISIT (OUTPATIENT)
Dept: FAMILY MEDICINE | Facility: CLINIC | Age: 45
End: 2025-06-17
Payer: COMMERCIAL

## 2025-06-17 VITALS
DIASTOLIC BLOOD PRESSURE: 80 MMHG | WEIGHT: 237 LBS | SYSTOLIC BLOOD PRESSURE: 112 MMHG | HEART RATE: 97 BPM | BODY MASS INDEX: 37.2 KG/M2 | HEIGHT: 67 IN | RESPIRATION RATE: 16 BRPM | OXYGEN SATURATION: 96 %

## 2025-06-17 DIAGNOSIS — Z12.11 SCREEN FOR COLON CANCER: ICD-10-CM

## 2025-06-17 DIAGNOSIS — L73.2 HIDRADENITIS SUPPURATIVA: ICD-10-CM

## 2025-06-17 DIAGNOSIS — L65.9 HAIR LOSS: ICD-10-CM

## 2025-06-17 DIAGNOSIS — Z12.4 CERVICAL CANCER SCREENING: ICD-10-CM

## 2025-06-17 DIAGNOSIS — Z13.6 SCREENING FOR CARDIOVASCULAR CONDITION: ICD-10-CM

## 2025-06-17 DIAGNOSIS — B00.1 COLD SORE: ICD-10-CM

## 2025-06-17 DIAGNOSIS — Z00.00 ROUTINE GENERAL MEDICAL EXAMINATION AT A HEALTH CARE FACILITY: Primary | ICD-10-CM

## 2025-06-17 DIAGNOSIS — Z13.1 SCREENING FOR DIABETES MELLITUS: ICD-10-CM

## 2025-06-17 DIAGNOSIS — R06.83 SNORES: ICD-10-CM

## 2025-06-17 DIAGNOSIS — M54.16 LUMBAR RADICULOPATHY, CHRONIC: ICD-10-CM

## 2025-06-17 DIAGNOSIS — G25.81 RESTLESS LEG SYNDROME: ICD-10-CM

## 2025-06-17 DIAGNOSIS — R40.0 DAYTIME SLEEPINESS: ICD-10-CM

## 2025-06-17 DIAGNOSIS — N89.8 VAGINAL DISCHARGE: ICD-10-CM

## 2025-06-17 LAB
CLUE CELLS: ABNORMAL
ERYTHROCYTE [DISTWIDTH] IN BLOOD BY AUTOMATED COUNT: 13.2 % (ref 10–15)
EST. AVERAGE GLUCOSE BLD GHB EST-MCNC: 94 MG/DL
HBA1C MFR BLD: 4.9 % (ref 0–5.6)
HCT VFR BLD AUTO: 42.6 % (ref 35–47)
HGB BLD-MCNC: 14.8 G/DL (ref 11.7–15.7)
MCH RBC QN AUTO: 30.3 PG (ref 26.5–33)
MCHC RBC AUTO-ENTMCNC: 34.7 G/DL (ref 31.5–36.5)
MCV RBC AUTO: 87 FL (ref 78–100)
PLATELET # BLD AUTO: 258 10E3/UL (ref 150–450)
RBC # BLD AUTO: 4.88 10E6/UL (ref 3.8–5.2)
TRICHOMONAS, WET PREP: ABNORMAL
WBC # BLD AUTO: 7.4 10E3/UL (ref 4–11)
WBC'S/HIGH POWER FIELD, WET PREP: ABNORMAL
YEAST, WET PREP: ABNORMAL

## 2025-06-17 PROCEDURE — 87624 HPV HI-RISK TYP POOLED RSLT: CPT | Performed by: PHYSICIAN ASSISTANT

## 2025-06-17 PROCEDURE — 36415 COLL VENOUS BLD VENIPUNCTURE: CPT | Performed by: PHYSICIAN ASSISTANT

## 2025-06-17 PROCEDURE — 80061 LIPID PANEL: CPT | Performed by: PHYSICIAN ASSISTANT

## 2025-06-17 PROCEDURE — 84443 ASSAY THYROID STIM HORMONE: CPT | Performed by: PHYSICIAN ASSISTANT

## 2025-06-17 PROCEDURE — 87210 SMEAR WET MOUNT SALINE/INK: CPT | Performed by: PHYSICIAN ASSISTANT

## 2025-06-17 PROCEDURE — 83036 HEMOGLOBIN GLYCOSYLATED A1C: CPT | Performed by: PHYSICIAN ASSISTANT

## 2025-06-17 PROCEDURE — 80053 COMPREHEN METABOLIC PANEL: CPT | Performed by: PHYSICIAN ASSISTANT

## 2025-06-17 PROCEDURE — 85027 COMPLETE CBC AUTOMATED: CPT | Performed by: PHYSICIAN ASSISTANT

## 2025-06-17 RX ORDER — SULFAMETHOXAZOLE AND TRIMETHOPRIM 800; 160 MG/1; MG/1
1 TABLET ORAL 2 TIMES DAILY
Qty: 20 TABLET | Refills: 0 | Status: SHIPPED | OUTPATIENT
Start: 2025-06-17

## 2025-06-17 RX ORDER — ACYCLOVIR 400 MG/1
400 TABLET ORAL EVERY 8 HOURS
Qty: 15 TABLET | Refills: 5 | Status: SHIPPED | OUTPATIENT
Start: 2025-06-17 | End: 2025-06-22

## 2025-06-17 RX ORDER — GABAPENTIN 300 MG/1
300 CAPSULE ORAL AT BEDTIME
Qty: 90 CAPSULE | Refills: 3 | Status: SHIPPED | OUTPATIENT
Start: 2025-06-17

## 2025-06-17 ASSESSMENT — PAIN SCALES - GENERAL: PAINLEVEL_OUTOF10: MILD PAIN (2)

## 2025-06-17 NOTE — PATIENT INSTRUCTIONS
Patient Education   Preventive Care Advice   This is general advice given by our system to help you stay healthy. However, your care team may have specific advice just for you. Please talk to your care team about your preventive care needs.  Nutrition  Eat 5 or more servings of fruits and vegetables each day.  Try wheat bread, brown rice and whole grain pasta (instead of white bread, rice, and pasta).  Get enough calcium and vitamin D. Check the label on foods and aim for 100% of the RDA (recommended daily allowance).  Lifestyle  Exercise at least 150 minutes each week  (30 minutes a day, 5 days a week).  Do muscle strengthening activities 2 days a week. These help control your weight and prevent disease.  No smoking.  Wear sunscreen to prevent skin cancer.  Have a dental exam and cleaning every 6 months.  Yearly exams  See your health care team every year to talk about:  Any changes in your health.  Any medicines your care team has prescribed.  Preventive care, family planning, and ways to prevent chronic diseases.  Shots (vaccines)   HPV shots (up to age 26), if you've never had them before.  Hepatitis B shots (up to age 59), if you've never had them before.  COVID-19 shot: Get this shot when it's due.  Flu shot: Get a flu shot every year.  Tetanus shot: Get a tetanus shot every 10 years.  Pneumococcal, hepatitis A, and RSV shots: Ask your care team if you need these based on your risk.  Shingles shot (for age 50 and up)  General health tests  Diabetes screening:  Starting at age 35, Get screened for diabetes at least every 3 years.  If you are younger than age 35, ask your care team if you should be screened for diabetes.  Cholesterol test: At age 39, start having a cholesterol test every 5 years, or more often if advised.  Bone density scan (DEXA): At age 50, ask your care team if you should have this scan for osteoporosis (brittle bones).  Hepatitis C: Get tested at least once in your life.  STIs (sexually  transmitted infections)  Before age 24: Ask your care team if you should be screened for STIs.  After age 24: Get screened for STIs if you're at risk. You are at risk for STIs (including HIV) if:  You are sexually active with more than one person.  You don't use condoms every time.  You or a partner was diagnosed with a sexually transmitted infection.  If you are at risk for HIV, ask about PrEP medicine to prevent HIV.  Get tested for HIV at least once in your life, whether you are at risk for HIV or not.  Cancer screening tests  Cervical cancer screening: If you have a cervix, begin getting regular cervical cancer screening tests starting at age 21.  Breast cancer scan (mammogram): If you've ever had breasts, begin having regular mammograms starting at age 40. This is a scan to check for breast cancer.  Colon cancer screening: It is important to start screening for colon cancer at age 45.  Have a colonoscopy test every 10 years (or more often if you're at risk) Or, ask your provider about stool tests like a FIT test every year or Cologuard test every 3 years.  To learn more about your testing options, visit:   .  For help making a decision, visit:   https://bit.ly/dn28746.  Prostate cancer screening test: If you have a prostate, ask your care team if a prostate cancer screening test (PSA) at age 55 is right for you.  Lung cancer screening: If you are a current or former smoker ages 50 to 80, ask your care team if ongoing lung cancer screenings are right for you.  For informational purposes only. Not to replace the advice of your health care provider. Copyright   2023 [facility]. All rights reserved. Clinically reviewed by the [facility]. [employer] [id number] - REV 01/24.  Learning About Stress  What is stress?     Stress is your body's response to a hard situation. Your body can have a physical, emotional, or mental response. Stress is a fact of life for most people, and it  affects everyone differently. What causes stress for you may not be stressful for someone else.  A lot of things can cause stress. You may feel stress when you go on a job interview, take a test, or run a race. This kind of short-term stress is normal and even useful. It can help you if you need to work hard or react quickly. For example, stress can help you finish an important job on time.  Long-term stress is caused by ongoing stressful situations or events. Examples of long-term stress include long-term health problems, ongoing problems at work, or conflicts in your family. Long-term stress can harm your health.  How does stress affect your health?  When you are stressed, your body responds as though you are in danger. It makes hormones that speed up your heart, make you breathe faster, and give you a burst of energy. This is called the fight-or-flight stress response. If the stress is over quickly, your body goes back to normal and no harm is done.  But if stress happens too often or lasts too long, it can have bad effects. Long-term stress can make you more likely to get sick, and it can make symptoms of some diseases worse. If you tense up when you are stressed, you may develop neck, shoulder, or low back pain. Stress is linked to high blood pressure and heart disease.  Stress also harms your emotional health. It can make you eastman, tense, or depressed. Your relationships may suffer, and you may not do well at work or school.  What can you do to manage stress?  You can try these things to help manage stress:   Do something active. Exercise or activity can help reduce stress. Walking is a great way to get started. Even everyday activities such as housecleaning or yard work can help.  Try yoga or odell chi. These techniques combine exercise and meditation. You may need some training at first to learn them.  Do something you enjoy. For example, listen to music or go to a movie. Practice your hobby or do volunteer  "work.  Meditate. This can help you relax, because you are not worrying about what happened before or what may happen in the future.  Do guided imagery. Imagine yourself in any setting that helps you feel calm. You can use online videos, books, or a teacher to guide you.  Do breathing exercises. For example:  From a standing position, bend forward from the waist with your knees slightly bent. Let your arms dangle close to the floor.  Breathe in slowly and deeply as you return to a standing position. Roll up slowly and lift your head last.  Hold your breath for just a few seconds in the standing position.  Breathe out slowly and bend forward from the waist.  Let your feelings out. Talk, laugh, cry, and express anger when you need to. Talking with supportive friends or family, a counselor, or a anton leader about your feelings is a healthy way to relieve stress. Avoid discussing your feelings with people who make you feel worse.  Write. It may help to write about things that are bothering you. This helps you find out how much stress you feel and what is causing it. When you know this, you can find better ways to cope.  What can you do to prevent stress?  You might try some of these things to help prevent stress:  Manage your time. This helps you find time to do the things you want and need to do.  Get enough sleep. Your body recovers from the stresses of the day while you are sleeping.  Get support. Your family, friends, and community can make a difference in how you experience stress.  Limit your news feed. Avoid or limit time on social media or news that may make you feel stressed.  Do something active. Exercise or activity can help reduce stress. Walking is a great way to get started.  Where can you learn more?  Go to https://www.[employer].net/patiented  Enter N032 in the search box to learn more about \"Learning About Stress.\"  Current as of: October 24, 2024  Content Version: 14.5 2024-2025 [employer]   Care instructions adapted under license by your healthcare professional. If you have questions about a medical condition or this instruction, always ask your healthcare professional. [employer] disclaims any warranty or liability for your use of this information.    Safer Sex: Care Instructions  Overview  Safer sex is a way to reduce your risk of getting a sexually transmitted infection (STI). It can also help prevent pregnancy.  Several products can help you practice safer sex and reduce your chance of STIs. One of the best is a condom. There are internal and external condoms. You can use a special rubber sheet (dental dam) for protection during oral sex. Disposable gloves can keep your hands from touching blood, semen, or other body fluids that can carry infections.  Remember that birth control methods such as diaphragms, IUDs, foams, and birth control pills do not stop you from getting STIs.  Follow-up care is a key part of your treatment and safety. Be sure to make and go to all appointments, and call your doctor if you are having problems. It's also a good idea to know your test results and keep a list of the medicines you take.  How can you care for yourself at home?  Think about getting vaccinated to help prevent hepatitis A, hepatitis B, and human papillomavirus (HPV). They can be spread through sex.  Use a condom every time you have sex. Use an external condom, which goes on the penis. Or use an internal condom, which goes into the vagina or anus.  Make sure you use the right size external condom. A condom that's too small can break easily. A condom that's too big can slip off during sex.  Use a new condom each time you have sex. Be careful not to poke a hole in the condom when you open the wrapper.  Don't use an internal condom and an external condom at the same time.  Never use petroleum jelly (such as Vaseline), grease, hand lotion, baby oil, or anything with oil in it. These products  "can make holes in the condom.  After intercourse, hold the edge of the condom as you remove it. This will help keep semen from spilling out of the condom.  Do not have sex with anyone who has symptoms of an STI, such as sores on the genitals or mouth.  Do not drink a lot of alcohol or use drugs before sex.  Limit your sex partners. Sex with one partner who has sex only with you can reduce your risk of getting an STI.  Don't share sex toys. But if you do share them, use a condom and clean the sex toys between each use.  Talk to any partners before you have sex. Talk about what you feel comfortable with and whether you have any boundaries with sex. And find out if your partner or partners may be at risk for any STI. Keep in mind that a person may be able to spread an STI even if they do not have symptoms. You and any partners may want to get tested for STIs.  Where can you learn more?  Go to https://www.[employer].net/patiented  Enter B608 in the search box to learn more about \"Safer Sex: Care Instructions.\"  Current as of: April 30, 2024  Content Version: 14.5 2024-2025 [employer].   Care instructions adapted under license by your healthcare professional. If you have questions about a medical condition or this instruction, always ask your healthcare professional. [employer] disclaims any warranty or liability for your use of this information.       "

## 2025-06-17 NOTE — PROGRESS NOTES
Preventive Care Visit  [facility]  BRITTNEE JOHNSON PA-C, Physician Assistant - Medical  Jun 17, 2025      Assessment & Plan     Routine general medical examination at a health care facility  Recommend twice yearly dental exams  Recommend every 2 year eye exams, annually if wearing corrective lenses  Recommend Calcium 1000mg daily either obtained in a 500mg twice daily supplement or through diet (or a combination of both).   Recommend 30-60 minutes cardiovascular exercise every day outside of usual activity and sveral days/week strength exercises.  Drink 40-60 ounces water daily  Try to get 4-5 servings fruits/vegetable daily  Eat quality lean proteins and high fiber whole foods  Try to stay away from processed or packaged foods    Tdap updated today.     - Comprehensive metabolic panel (BMP + Alb, Alk Phos, ALT, AST, Total. Bili, TP)  - CBC with platelets    F/U annually for wellness exam and in the interim as needed for problem visits.    Cervical cancer screening  - HPV and Gynecologic Cytology Panel - Recommended Age 30 - 65 Years  - Gynecologic Cytology (PAP)    Screening for diabetes mellitus  - Hemoglobin A1c    Screen for colon cancer  - Colonoscopy Screening  Referral; Future    Screening for cardiovascular condition  - Lipid Profile (Chol, Trig, HDL, LDL calc)    Hair loss  - TSH with free T4 reflex    Snores  - Adult Sleep Eval & Management  Referral; Future    Daytime sleepiness  - Adult Sleep Eval & Management  Referral; Future    Cold sore  - acyclovir (ZOVIRAX) 400 MG tablet; Take 1 tablet (400 mg) by mouth every 8 hours for 5 days.    Lumbar radiculopathy, chronic  - gabapentin (NEURONTIN) 300 MG capsule; Take 1 capsule (300 mg) by mouth at bedtime.    Restless leg syndrome  - gabapentin (NEURONTIN) 300 MG capsule; Take 1 capsule (300 mg) by mouth at bedtime.    Hidradenitis suppurativa  - sulfamethoxazole-trimethoprim (BACTRIM DS) 800-160 MG  "tablet; Take 1 tablet by mouth 2 times daily.    Vaginal discharge  - Wet prep - Clinic Collect    Patient has been advised of split billing requirements and indicates understanding: Yes        BMI  Estimated body mass index is 37.64 kg/m  as calculated from the following:    Height as of this encounter: 1.69 m (5' 6.54\").    Weight as of this encounter: 107.5 kg (237 lb).       Counseling  Appropriate preventive services were addressed with this patient via screening, questionnaire, or discussion as appropriate for fall prevention, nutrition, physical activity, Tobacco-use cessation, social engagement, weight loss and cognition.  Checklist reviewing preventive services available has been given to the patient.  Reviewed patient's diet, addressing concerns and/or questions.   She is at risk for lack of exercise and has been provided with information to increase physical activity for the benefit of her well-being.   She is at risk for psychosocial distress and has been provided with information to reduce risk.         Miriam Cunningham is a 45 year old, presenting for the following:  Physical        6/17/2025     4:15 PM   Additional Questions   Roomed by April   Accompanied by self          Marisa is a pleasant 45 year old female who presents to clinic for her annual physical. She is concerned because she is not sleeping well and is tired during the day. She does snore. She has not had a sleep study. She has RLS and is on medications for that. She has a Mirena IUD and would like to know when it is due to be changed (was placed 7 years ago). She is due for a PAP/HPV. She has no concerns for STDs. Mammogram was done in May, normal. Due for colonoscopy. Her dad had rectal cancer.     ROS pertinent positives:  Nasal congestion, thinks she might have allergies. Gets bad sinus infections when they happen (about once/year). Has intermittent RUQ pain. No gallbladder but that area feels tight to her. Has constipation. No " hernias. She has little bumps on her labia that she would like assessed. Her knees crack a lot, but no pain.         Advance Care Planning    Discussed advance care planning with patient; however, patient declined at this time.        6/16/2025   General Health   How would you rate your overall physical health? Good   Feel stress (tense, anxious, or unable to sleep) To some extent   (!) STRESS CONCERN      6/16/2025   Nutrition   Three or more servings of calcium each day? (!) NO   Diet: Regular (no restrictions)    Breakfast skipped   How many servings of fruit and vegetables per day? (!) 2-3   How many sweetened beverages each day? 0-1       Multiple values from one day are sorted in reverse-chronological order         6/16/2025   Exercise   Days per week of moderate/strenous exercise 2 days   Average minutes spent exercising at this level 10 min   (!) EXERCISE CONCERN      6/16/2025   Social Factors   Frequency of gathering with friends or relatives Twice a week   Worry food won't last until get money to buy more No   Food not last or not have enough money for food? No   Do you have housing? (Housing is defined as stable permanent housing and does not include staying outside in a car, in a tent, in an abandoned building, in an overnight shelter, or couch-surfing.) Yes   Are you worried about losing your housing? No   Lack of transportation? No   Unable to get utilities (heat,electricity)? No         6/16/2025   Dental   Dentist two times every year? Yes           Today's PHQ-2 Score:       4/11/2025     8:31 AM   PHQ-2 ( 1999 [employer])   Q1: Little interest or pleasure in doing things 0   Q2: Feeling down, depressed or hopeless 0   PHQ-2 Score 0    Q1: Little interest or pleasure in doing things Not at all   Q2: Feeling down, depressed or hopeless Not at all   PHQ-2 Score 0       Patient-reported         6/16/2025   Substance Use   Alcohol more than 3/day or more than 7/wk No   Do you use any other substances  recreationally? No     Social History     Tobacco Use    Smoking status: Former     Passive exposure: Past    Smokeless tobacco: Never   Vaping Use    Vaping status: Never Used   Substance Use Topics    Alcohol use: Yes     Comment: rare    Drug use: No           5/30/2025   LAST FHS-7 RESULTS   1st degree relative breast or ovarian cancer No   Any relative bilateral breast cancer No   Any male have breast cancer No   Any ONE woman have BOTH breast AND ovarian cancer No   Any woman with breast cancer before 50yrs No   2 or more relatives with breast AND/OR ovarian cancer No   2 or more relatives with breast AND/OR bowel cancer No        Mammogram Screening - Mammogram every 1-2 years updated in Health Maintenance based on mutual decision making          6/16/2025   One time HIV Screening   Previous HIV test? Yes         6/16/2025   STI Screening   New sexual partner(s) since last STI/HIV test? (!) YES has not had HIV test for 20 years     History of abnormal Pap smear: Yes, 20 yrs ago and colposcopy done at that time.         Latest Ref Rng & Units 12/5/2018     4:37 PM 12/5/2018     4:30 PM 9/2/2015    12:00 AM   PAP / HPV   PAP (Historical)  NIL      HPV 16 DNA NEG^Negative  Negative     HPV 18 DNA NEG^Negative  Negative     Other HR HPV NEG^Negative  Negative     PAP-ABSTRACT    See Scanned Document      ASCVD Risk   The ASCVD Risk score (Manda MURPHY, et al., 2019) failed to calculate for the following reasons:    Cannot find a previous HDL lab    Cannot find a previous total cholesterol lab        6/16/2025   Contraception/Family Planning   Questions about contraception or family planning (!) YES - discussed       Reviewed and updated as needed this visit by Provider   Tobacco  Allergies  Meds  Problems  Med Hx  Surg Hx  Fam Hx            Past Medical History:   Diagnosis Date    Uncomplicated asthma     Only when I have a cold     Past Surgical History:   Procedure Laterality Date     CHOLECYSTECTOMY      GALLBLADDER SURGERY      HERNIA REPAIR       OB History    Para Term  AB Living   1 1 1 0 0 2   SAB IAB Ectopic Multiple Live Births   0 0 0 0 1      # Outcome Date GA Lbr Nestor/2nd Weight Sex Type Anes PTL Lv   1 Term 06    F    ANALY     BP Readings from Last 3 Encounters:   25 112/80   25 114/84   22 114/86    Wt Readings from Last 3 Encounters:   25 107.5 kg (237 lb)   25 113.7 kg (250 lb 9.6 oz)   22 115.2 kg (254 lb)                  Patient Active Problem List   Diagnosis    Carpal tunnel syndrome    Radial styloid tenosynovitis    Synovitis and tenosynovitis    Wrist tendonitis    Herniation of intervertebral disc between L4 and L5    Morbid obesity (H)    Recurrent cold sores    Lumbar radiculopathy, right    Skin lesion     Past Surgical History:   Procedure Laterality Date    CHOLECYSTECTOMY      GALLBLADDER SURGERY      HERNIA REPAIR         Social History     Tobacco Use    Smoking status: Former     Passive exposure: Past    Smokeless tobacco: Never   Substance Use Topics    Alcohol use: Yes     Comment: rare     Family History   Problem Relation Age of Onset    Diabetes Mother         Diagnosed 2018    Depression Mother     Colon Cancer Father     Hyperlipidemia Father     Depression Father          Current Outpatient Medications   Medication Sig Dispense Refill    acyclovir (ZOVIRAX) 400 MG tablet TAKE 1 TABLET (400 MG) BY MOUTH EVERY 12 HOURS FOR 90 DAYS 180 tablet 0    albuterol (PROAIR HFA/PROVENTIL HFA/VENTOLIN HFA) 108 (90 Base) MCG/ACT inhaler Inhale 2 puffs into the lungs every 6 hours as needed for shortness of breath / dyspnea or wheezing 18 g 11    gabapentin (NEURONTIN) 300 MG capsule Take 1 capsule (300 mg) by mouth at bedtime. 90 capsule 0    gabapentin (NEURONTIN) 300 MG capsule One capsule daily as needed for back pain. 30 capsule 11    Levonorgestrel (MIRENA IU) by Intrauterine route.       "loratadine (CLARITIN) 10 MG tablet Take 10 mg by mouth      methocarbamol (ROBAXIN) 500 MG tablet Take 2 tablets (1,000 mg) by mouth 3 times daily as needed for muscle spasms 30 tablet 1    mupirocin (BACTROBAN) 2 % external ointment Apply topically 3 times daily 30 g 0    valACYclovir (VALTREX) 1000 mg tablet Take 2 tablets (2,000 mg) by mouth 2 times daily 4 tablet 4     Allergies   Allergen Reactions    Cats     Dogs      No lab results found.            Review of Systems  Constitutional, HEENT, cardiovascular, pulmonary, GI, , musculoskeletal, neuro, skin, endocrine and psych systems are negative, except as otherwise noted.     Objective    Exam  /80   Pulse 97   Resp 16   Ht 1.69 m (5' 6.54\")   Wt 107.5 kg (237 lb)   SpO2 96%   BMI 37.64 kg/m     Estimated body mass index is 37.64 kg/m  as calculated from the following:    Height as of this encounter: 1.69 m (5' 6.54\").    Weight as of this encounter: 107.5 kg (237 lb).    Physical Exam  Vitals and nursing note reviewed.   Constitutional:       General: She is awake.      Appearance: Normal appearance. She is well-developed. She is not ill-appearing or toxic-appearing.   HENT:      Head: Normocephalic and atraumatic.      Jaw: No trismus.      Right Ear: Tympanic membrane, ear canal and external ear normal.      Left Ear: Tympanic membrane, ear canal and external ear normal.      Nose: Nose normal. No congestion or rhinorrhea.      Mouth/Throat:      Lips: Pink.      Mouth: Mucous membranes are moist.      Dentition: Normal dentition.      Tongue: No lesions. Tongue does not deviate from midline.      Pharynx: Oropharynx is clear. Uvula midline. No oropharyngeal exudate or posterior oropharyngeal erythema.   Eyes:      General: Lids are normal. No allergic shiner.     Extraocular Movements: Extraocular movements intact.      Conjunctiva/sclera: Conjunctivae normal.      Pupils: Pupils are equal, round, and reactive to light.   Neck:      Thyroid: " No thyroid mass, thyromegaly or thyroid tenderness.      Trachea: Trachea normal.   Cardiovascular:      Rate and Rhythm: Normal rate and regular rhythm.      Pulses:           Posterior tibial pulses are 2+ on the right side and 2+ on the left side.      Heart sounds: Normal heart sounds. No murmur heard.  Pulmonary:      Effort: Pulmonary effort is normal.      Breath sounds: Normal breath sounds and air entry.   Abdominal:      General: Bowel sounds are normal.      Palpations: Abdomen is soft.      Tenderness: There is no abdominal tenderness.      Hernia: No hernia is present.   Genitourinary:     General: Normal vulva.      Vagina: Normal.      Cervix: Normal.      Comments: Labial milia.   Musculoskeletal:      Cervical back: Normal range of motion.      Right lower leg: No edema.      Left lower leg: No edema.      Comments: Ambulatory without assistive device  Limbs with grossly normal AROM   Lymphadenopathy:      Head:      Right side of head: No submental, submandibular, tonsillar, preauricular or posterior auricular adenopathy.      Left side of head: No submental, submandibular, tonsillar, preauricular or posterior auricular adenopathy.      Cervical: No cervical adenopathy.      Right cervical: No superficial cervical adenopathy.     Left cervical: No superficial cervical adenopathy.      Upper Body:      Right upper body: No supraclavicular adenopathy.      Left upper body: No supraclavicular adenopathy.   Skin:     General: Skin is warm and dry.      Capillary Refill: Capillary refill takes 2 to 3 seconds.      Findings: No lesion, rash or wound.   Neurological:      General: No focal deficit present.      Mental Status: She is alert and oriented to person, place, and time.      Motor: Motor function is intact.      Coordination: Coordination is intact.      Gait: Gait is intact.      Deep Tendon Reflexes:      Reflex Scores:       Patellar reflexes are 2+ on the right side and 2+ on the left side.      Comments: CN II-XII grossly intact   Psychiatric:         Mood and Affect: Mood normal.         Speech: Speech normal.         Behavior: Behavior is cooperative.         Thought Content: Thought content normal.         Cognition and Memory: Cognition normal.         Judgment: Judgment normal.         Signed Electronically by: BRITTNEE JOHNSON PA-C

## 2025-06-18 ENCOUNTER — PATIENT OUTREACH (OUTPATIENT)
Dept: CARE COORDINATION | Facility: CLINIC | Age: 45
End: 2025-06-18
Payer: COMMERCIAL

## 2025-06-18 LAB
ALBUMIN SERPL BCG-MCNC: 4.6 G/DL (ref 3.5–5.2)
ALP SERPL-CCNC: 54 U/L (ref 40–150)
ALT SERPL W P-5'-P-CCNC: 19 U/L (ref 0–50)
ANION GAP SERPL CALCULATED.3IONS-SCNC: 14 MMOL/L (ref 7–15)
AST SERPL W P-5'-P-CCNC: 19 U/L (ref 0–45)
BILIRUB SERPL-MCNC: 0.8 MG/DL
BUN SERPL-MCNC: 12.3 MG/DL (ref 6–20)
CALCIUM SERPL-MCNC: 9.4 MG/DL (ref 8.8–10.4)
CHLORIDE SERPL-SCNC: 101 MMOL/L (ref 98–107)
CHOLEST SERPL-MCNC: 264 MG/DL
CREAT SERPL-MCNC: 0.86 MG/DL (ref 0.51–0.95)
EGFRCR SERPLBLD CKD-EPI 2021: 84 ML/MIN/1.73M2
FASTING STATUS PATIENT QL REPORTED: NO
FASTING STATUS PATIENT QL REPORTED: NO
GLUCOSE SERPL-MCNC: 86 MG/DL (ref 70–99)
HCO3 SERPL-SCNC: 24 MMOL/L (ref 22–29)
HDLC SERPL-MCNC: 50 MG/DL
LDLC SERPL CALC-MCNC: 191 MG/DL
NONHDLC SERPL-MCNC: 214 MG/DL
POTASSIUM SERPL-SCNC: 3.9 MMOL/L (ref 3.4–5.3)
PROT SERPL-MCNC: 7.7 G/DL (ref 6.4–8.3)
SODIUM SERPL-SCNC: 139 MMOL/L (ref 135–145)
TRIGL SERPL-MCNC: 113 MG/DL
TSH SERPL DL<=0.005 MIU/L-ACNC: 2.5 UIU/ML (ref 0.3–4.2)

## 2025-06-19 ENCOUNTER — RESULTS FOLLOW-UP (OUTPATIENT)
Dept: OBGYN | Facility: CLINIC | Age: 45
End: 2025-06-19

## 2025-06-19 LAB
HPV HR 12 DNA CVX QL NAA+PROBE: NEGATIVE
HPV16 DNA CVX QL NAA+PROBE: NEGATIVE
HPV18 DNA CVX QL NAA+PROBE: NEGATIVE
HUMAN PAPILLOMA VIRUS FINAL DIAGNOSIS: NORMAL

## 2025-06-23 LAB
BKR AP ASSOCIATED HPV REPORT: NORMAL
BKR LAB AP GYN ADEQUACY: NORMAL
BKR LAB AP GYN INTERPRETATION: NORMAL
BKR LAB AP PREVIOUS ABNORMAL: NORMAL
PATH REPORT.COMMENTS IMP SPEC: NORMAL
PATH REPORT.COMMENTS IMP SPEC: NORMAL
PATH REPORT.RELEVANT HX SPEC: NORMAL

## 2025-06-24 ENCOUNTER — TELEPHONE (OUTPATIENT)
Dept: GASTROENTEROLOGY | Facility: CLINIC | Age: 45
End: 2025-06-24
Payer: COMMERCIAL

## 2025-06-24 ENCOUNTER — HOSPITAL ENCOUNTER (OUTPATIENT)
Facility: AMBULATORY SURGERY CENTER | Age: 45
End: 2025-06-24
Attending: FAMILY MEDICINE | Admitting: FAMILY MEDICINE
Payer: COMMERCIAL

## 2025-06-24 NOTE — TELEPHONE ENCOUNTER
"Endoscopy Scheduling Screen    Caller: patient    Have you had any respiratory illness or flu-like symptoms in the last 10 days?  No    Patient is ACTIVE on [employer].  Inform patient that all appointment instructions will be sent via [employer].    Review patient's insurance for any non participating payor.    Ordering/Referring Provider: Edis   (If ordering provider performs procedure, schedule with ordering provider unless otherwise instructed. )    BMI: Estimated body mass index is 37.64 kg/m  as calculated from the following:    Height as of 6/17/25: 1.69 m (5' 6.54\").    Weight as of 6/17/25: 107.5 kg (237 lb).     Sedation Ordered  moderate sedation.   If patient BMI > 50 do not schedule in [facility].    If patient BMI > 45 do not schedule at [facility].    Are you taking methadone or Suboxone?  NO, No RN review required.    Have you been diagnosed and are being treated for severe PTSD or severe anxiety?  NO, No RN review required.    Are you taking any prescription medications for pain 3 or more times per week?   NO, No RN review required.    Do you have a history of malignant hyperthermia?  No    (Females) Are you currently pregnant?        Have you been diagnosed or told you have pulmonary hypertension?   No    Do you have an LVAD?  No    Have you been told you have moderate to severe sleep apnea?  No.    Have you been told you have COPD, asthma, or any other lung disease?  No-sometimes when she has a cold, she will get breathing issues    Has your doctor ordered any cardiac tests like echo, angiogram, stress test, ablation, or EKG, that you have not completed yet?  No    Do you  have a history of any heart conditions?  No     Have you ever had or are you waiting for an organ transplant?  No. Continue scheduling, no site restrictions.    Have you had a stroke or transient ischemic attack (TIA aka \"mini stroke\") in the last 2 years?   No.    Have you been diagnosed with or been told you have cirrhosis of the " "liver?   No.    Are you currently on dialysis?   No    Do you need assistance transferring?   No    BMI: Estimated body mass index is 37.64 kg/m  as calculated from the following:    Height as of 6/17/25: 1.69 m (5' 6.54\").    Weight as of 6/17/25: 107.5 kg (237 lb).     Is patients BMI > 40 and scheduling location UP?  No    Do you take an injectable or oral medication for weight loss or diabetes (excluding insulin)?  No    Do you take the medication Naltrexone?  No    Do you take blood thinners?  No       Prep   Are you currently on dialysis or do you have chronic kidney disease?  No    Do you have a diagnosis of diabetes?  No    Do you have a diagnosis of cystic fibrosis (CF)?  No    On a regular basis do you go 3 -5 days between bowel movements?  No    BMI > 40?  No    Preferred Pharmacy:    [id number] [place] [facility] [address] [facility]  [address] [facility] [address]  Phone: [phone] Fax: [phone]    Final Scheduling Details     Procedure scheduled  Colonoscopy    Surgeon:  Julee     Date of procedure:  8/1/25     Pre-OP / PAC:   No - Not required for this site.    Location  [facility] - [facility] - Patient preference.    Sedation   Moderate Sedation - Per order.      Patient Reminders:   You will receive a call from a Nurse to review instructions and health history.  This assessment must be completed prior to your procedure.  Failure to complete the Nurse assessment may result in the procedure being cancelled.      On the day of your procedure, please designate an adult(s) who can drive you home stay with you for the next 24 hours. The medicines used in the exam will make you sleepy. You will not be able to drive.      You cannot take public transportation, ride share services, or non-medical taxi service without a responsible caregiver.  Medical transport services are allowed with the requirement that a responsible caregiver will receive you at your destination.  We require that " drivers and caregivers are confirmed prior to your procedure.

## 2025-07-28 ENCOUNTER — TELEPHONE (OUTPATIENT)
Dept: GASTROENTEROLOGY | Facility: CLINIC | Age: 45
End: 2025-07-28
Payer: COMMERCIAL

## 2025-07-28 NOTE — TELEPHONE ENCOUNTER
Caller: Marisa Randall      Reason for Reschedule/Cancellation (please be detailed, any staff messages or encounters to note?):   Patients step dad is in the hospital, no longer has transportation     Did you cancel or rescheduled an [facility] procedure? No.    Is screening questionnaire older than 3 months from the reschedule date.   If Yes, please complete screening questionnaire. No    Prior to reschedule please review:  Ordering Provider: Edis Kelly Determined: cs  Does patient have any ASC Exclusions, please identify?: n    Notes on Cancelled Procedure:  Procedure: Lower Endoscopy [Colonoscopy]   Date: 8/1  Location: [facility] [place]; [address], [place] [address]   Surgeon: Julee    Rescheduled: No, patient will call back at a later time to reschedule, case canceled and ctl with ordering provider